# Patient Record
Sex: FEMALE | Race: OTHER | HISPANIC OR LATINO | URBAN - METROPOLITAN AREA
[De-identification: names, ages, dates, MRNs, and addresses within clinical notes are randomized per-mention and may not be internally consistent; named-entity substitution may affect disease eponyms.]

---
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
## 2024-02-03 ENCOUNTER — INPATIENT (INPATIENT)
Facility: HOSPITAL | Age: 19
LOS: 3 days | Discharge: HOME | DRG: 832 | End: 2024-02-07
Attending: OBSTETRICS & GYNECOLOGY | Admitting: OBSTETRICS & GYNECOLOGY
Payer: MEDICAID

## 2024-02-03 VITALS
RESPIRATION RATE: 16 BRPM | TEMPERATURE: 98 F | OXYGEN SATURATION: 97 % | SYSTOLIC BLOOD PRESSURE: 96 MMHG | DIASTOLIC BLOOD PRESSURE: 67 MMHG | HEART RATE: 129 BPM

## 2024-02-03 LAB
APTT BLD: 28.3 SEC — SIGNIFICANT CHANGE UP (ref 24.5–35.6)
BASOPHILS # BLD AUTO: 0.09 K/UL — SIGNIFICANT CHANGE UP (ref 0–0.2)
BASOPHILS NFR BLD AUTO: 0.7 % — SIGNIFICANT CHANGE UP (ref 0–2)
EOSINOPHIL # BLD AUTO: 0.72 K/UL — HIGH (ref 0–0.5)
EOSINOPHIL NFR BLD AUTO: 5.7 % — SIGNIFICANT CHANGE UP (ref 0–6)
FIBRINOGEN PPP-MCNC: 451 MG/DL — HIGH (ref 200–450)
HCT VFR BLD CALC: 36.5 % — SIGNIFICANT CHANGE UP (ref 34.5–45)
HGB BLD-MCNC: 12.3 G/DL — SIGNIFICANT CHANGE UP (ref 11.5–15.5)
IMM GRANULOCYTES NFR BLD AUTO: 1.6 % — HIGH (ref 0–0.9)
INR BLD: 0.94 RATIO — SIGNIFICANT CHANGE UP (ref 0.85–1.18)
LYMPHOCYTES # BLD AUTO: 1.81 K/UL — SIGNIFICANT CHANGE UP (ref 1–3.3)
LYMPHOCYTES # BLD AUTO: 14.3 % — SIGNIFICANT CHANGE UP (ref 13–44)
MCHC RBC-ENTMCNC: 30.6 PG — SIGNIFICANT CHANGE UP (ref 27–34)
MCHC RBC-ENTMCNC: 33.7 GM/DL — SIGNIFICANT CHANGE UP (ref 32–36)
MCV RBC AUTO: 90.8 FL — SIGNIFICANT CHANGE UP (ref 80–100)
MONOCYTES # BLD AUTO: 1.01 K/UL — HIGH (ref 0–0.9)
MONOCYTES NFR BLD AUTO: 8 % — SIGNIFICANT CHANGE UP (ref 2–14)
NEUTROPHILS # BLD AUTO: 8.84 K/UL — HIGH (ref 1.8–7.4)
NEUTROPHILS NFR BLD AUTO: 69.7 % — SIGNIFICANT CHANGE UP (ref 43–77)
PLATELET # BLD AUTO: 329 K/UL — SIGNIFICANT CHANGE UP (ref 150–400)
PROTHROM AB SERPL-ACNC: 10.5 SEC — SIGNIFICANT CHANGE UP (ref 9.5–13)
RBC # BLD: 4.02 M/UL — SIGNIFICANT CHANGE UP (ref 3.8–5.2)
RBC # FLD: 13.8 % — SIGNIFICANT CHANGE UP (ref 10.3–14.5)
WBC # BLD: 12.67 K/UL — HIGH (ref 3.8–10.5)
WBC # FLD AUTO: 12.67 K/UL — HIGH (ref 3.8–10.5)

## 2024-02-03 PROCEDURE — 99285 EMERGENCY DEPT VISIT HI MDM: CPT

## 2024-02-03 RX ORDER — SODIUM CHLORIDE 9 MG/ML
1000 INJECTION INTRAMUSCULAR; INTRAVENOUS; SUBCUTANEOUS ONCE
Refills: 0 | Status: COMPLETED | OUTPATIENT
Start: 2024-02-03 | End: 2024-02-03

## 2024-02-03 RX ORDER — ACETAMINOPHEN 500 MG
1000 TABLET ORAL ONCE
Refills: 0 | Status: COMPLETED | OUTPATIENT
Start: 2024-02-03 | End: 2024-02-03

## 2024-02-03 NOTE — ED PROVIDER NOTE - CLINICAL SUMMARY MEDICAL DECISION MAKING FREE TEXT BOX
18y F presents with new onset seizure. Pt also reports possible pregnancy with LMP 3 months ago. Pt with palpable fundus above umbilicus, POCUS showing ~23 week live IUP. Mildly hypotensive and tachycardic on arrival but well appearing and in no distress with nonfocal neuro exam. Spoke with GYN resident; advising beginning with medical workup for seizure and they will evaluate pt. 18y F presents with new onset seizure. Pt also reports possible pregnancy with LMP 3 months ago. Pt with palpable fundus above umbilicus, POCUS showing ~23 week live IUP. Mildly hypotensive and tachycardic on arrival but well appearing and in no distress with nonfocal neuro exam. Spoke with GYN resident; advising beginning with medical workup for seizure and they will evaluate pt. Plan for admission.

## 2024-02-03 NOTE — ED PROVIDER NOTE - PROGRESS NOTE DETAILS
[place]: CT head negative, no acute findings on labs. Spoke with GYN team, who will admit to their service given that pt is > 20 weeks gestation.

## 2024-02-03 NOTE — ED PROVIDER NOTE - NS ED ROS FT
Constitutional: no fever  CV: no chest pain  Resp: no cough, no shortness of breath  GI: no abdominal pain, no vomiting, no diarrhea  : no dysuria  MSK: no joint pain  Neuro: +headache, +seizure

## 2024-02-03 NOTE — ED PROVIDER NOTE - PHYSICAL EXAMINATION
Constitutional: Awake, alert, in no acute distress  Eyes: no scleral icterus, PERRL, EOMI  HENT: normocephalic, atraumatic, moist oral mucosa, +superficial tongue abrasions  Neck: supple  CV: RRR, no murmur  Pulm: non-labored respirations, CTAB  Abdomen: soft, non-tender, non-distended, +gravid, fundus palpable ~3 cm above umbilicus  Extremities: no edema, no deformity  Skin: no rash, no jaundice  Neuro: AAOx3, moving all extremities equally, CNs II-XII intact, no facial asymmetry, 5/5 strength and sensation in all extremities, no dysmetria

## 2024-02-03 NOTE — ED ADULT TRIAGE NOTE - CHIEF COMPLAINT QUOTE
Pt. BIBA witnessed seizure at work. Pt. states she remembers falling and "shaking". Pt. has no hx of seizures. +tongue bite, + urination. tachycardiac on arrival.

## 2024-02-03 NOTE — ED PROVIDER NOTE - CARE PLAN
1 Principal Discharge DX:	Seizure   Principal Discharge DX:	Seizure  Secondary Diagnosis:	Single pregnancy, second trimester

## 2024-02-03 NOTE — ED PROVIDER NOTE - OBJECTIVE STATEMENT
18y F w/ no known PMH, presents for seizure. Pt has no prior history of seizures. Says she was eating at work tonight and remembers blacking out and feeling shaky. Episode was witnessed by coworkers. Says she bit her tongue and was incontinent of urine. Pt says she might also be pregnant; last menstrual period 3 months ago. Has no prior history of pregnancy and has not seen OB or done home pregnancy test. Endorses mild headache at this time. No abdominal pain or vaginal bleeding. Denies alcohol or drug use.

## 2024-02-04 DIAGNOSIS — Z00.00 ENCOUNTER FOR GENERAL ADULT MEDICAL EXAMINATION WITHOUT ABNORMAL FINDINGS: ICD-10-CM

## 2024-02-04 DIAGNOSIS — G43.909 MIGRAINE, UNSPECIFIED, NOT INTRACTABLE, WITHOUT STATUS MIGRAINOSUS: ICD-10-CM

## 2024-02-04 DIAGNOSIS — O99.350 DISEASES OF THE NERVOUS SYSTEM COMPLICATING PREGNANCY, UNSPECIFIED TRIMESTER: ICD-10-CM

## 2024-02-04 DIAGNOSIS — Z3A.21 21 WEEKS GESTATION OF PREGNANCY: ICD-10-CM

## 2024-02-04 DIAGNOSIS — R56.9 UNSPECIFIED CONVULSIONS: ICD-10-CM

## 2024-02-04 LAB
ALBUMIN SERPL ELPH-MCNC: 3.8 G/DL — SIGNIFICANT CHANGE UP (ref 3.3–5.2)
ALP SERPL-CCNC: 76 U/L — SIGNIFICANT CHANGE UP (ref 40–120)
ALT FLD-CCNC: 21 U/L — SIGNIFICANT CHANGE UP
AMPHET UR-MCNC: NEGATIVE — SIGNIFICANT CHANGE UP
ANION GAP SERPL CALC-SCNC: 14 MMOL/L — SIGNIFICANT CHANGE UP (ref 5–17)
APPEARANCE UR: ABNORMAL
AST SERPL-CCNC: 28 U/L — SIGNIFICANT CHANGE UP
BACTERIA # UR AUTO: ABNORMAL /HPF
BARBITURATES UR SCN-MCNC: NEGATIVE — SIGNIFICANT CHANGE UP
BENZODIAZ UR-MCNC: NEGATIVE — SIGNIFICANT CHANGE UP
BILIRUB SERPL-MCNC: 0.2 MG/DL — LOW (ref 0.4–2)
BILIRUB UR-MCNC: NEGATIVE — SIGNIFICANT CHANGE UP
BLD GP AB SCN SERPL QL: SIGNIFICANT CHANGE UP
BUN SERPL-MCNC: 5.3 MG/DL — LOW (ref 8–20)
CALCIUM SERPL-MCNC: 9 MG/DL — SIGNIFICANT CHANGE UP (ref 8.4–10.5)
CHLORIDE SERPL-SCNC: 102 MMOL/L — SIGNIFICANT CHANGE UP (ref 96–108)
CK SERPL-CCNC: 62 U/L — SIGNIFICANT CHANGE UP (ref 25–170)
CO2 SERPL-SCNC: 20 MMOL/L — LOW (ref 22–29)
COCAINE METAB.OTHER UR-MCNC: NEGATIVE — SIGNIFICANT CHANGE UP
COLOR SPEC: YELLOW — SIGNIFICANT CHANGE UP
CREAT ?TM UR-MCNC: 99 MG/DL — SIGNIFICANT CHANGE UP
CREAT SERPL-MCNC: 0.35 MG/DL — LOW (ref 0.5–1.3)
DIFF PNL FLD: NEGATIVE — SIGNIFICANT CHANGE UP
EGFR: 150 ML/MIN/1.73M2 — SIGNIFICANT CHANGE UP
ETHANOL SERPL-MCNC: <10 MG/DL — SIGNIFICANT CHANGE UP (ref 0–9)
GLUCOSE SERPL-MCNC: 88 MG/DL — SIGNIFICANT CHANGE UP (ref 70–99)
GLUCOSE UR QL: NEGATIVE MG/DL — SIGNIFICANT CHANGE UP
HCG SERPL-ACNC: HIGH MIU/ML
KETONES UR-MCNC: NEGATIVE MG/DL — SIGNIFICANT CHANGE UP
LDH SERPL L TO P-CCNC: 163 U/L — SIGNIFICANT CHANGE UP (ref 98–192)
LEUKOCYTE ESTERASE UR-ACNC: NEGATIVE — SIGNIFICANT CHANGE UP
MAGNESIUM SERPL-MCNC: 1.7 MG/DL — LOW (ref 1.8–2.6)
METHADONE UR-MCNC: NEGATIVE — SIGNIFICANT CHANGE UP
NITRITE UR-MCNC: POSITIVE
OPIATES UR-MCNC: NEGATIVE — SIGNIFICANT CHANGE UP
PCP SPEC-MCNC: SIGNIFICANT CHANGE UP
PCP UR-MCNC: NEGATIVE — SIGNIFICANT CHANGE UP
PH UR: 6 — SIGNIFICANT CHANGE UP (ref 5–8)
POTASSIUM SERPL-MCNC: 3.7 MMOL/L — SIGNIFICANT CHANGE UP (ref 3.5–5.3)
POTASSIUM SERPL-SCNC: 3.7 MMOL/L — SIGNIFICANT CHANGE UP (ref 3.5–5.3)
PROT ?TM UR-MCNC: 18 MG/DL — HIGH (ref 0–12)
PROT SERPL-MCNC: 7.2 G/DL — SIGNIFICANT CHANGE UP (ref 6.6–8.7)
PROT UR-MCNC: SIGNIFICANT CHANGE UP MG/DL
PROT/CREAT UR-RTO: 0.2 RATIO — SIGNIFICANT CHANGE UP
RBC CASTS # UR COMP ASSIST: 2 /HPF — SIGNIFICANT CHANGE UP (ref 0–4)
SODIUM SERPL-SCNC: 136 MMOL/L — SIGNIFICANT CHANGE UP (ref 135–145)
SP GR SPEC: 1.02 — SIGNIFICANT CHANGE UP (ref 1–1.03)
SQUAMOUS # UR AUTO: 14 /HPF — HIGH (ref 0–5)
THC UR QL: NEGATIVE — SIGNIFICANT CHANGE UP
URATE SERPL-MCNC: 4.4 MG/DL — SIGNIFICANT CHANGE UP (ref 2.4–5.7)
UROBILINOGEN FLD QL: 1 MG/DL — SIGNIFICANT CHANGE UP (ref 0.2–1)
WBC UR QL: 7 /HPF — HIGH (ref 0–5)

## 2024-02-04 PROCEDURE — 70450 CT HEAD/BRAIN W/O DYE: CPT | Mod: 26,ME

## 2024-02-04 PROCEDURE — 93010 ELECTROCARDIOGRAM REPORT: CPT

## 2024-02-04 PROCEDURE — 99223 1ST HOSP IP/OBS HIGH 75: CPT

## 2024-02-04 PROCEDURE — 99254 IP/OBS CNSLTJ NEW/EST MOD 60: CPT | Mod: GC

## 2024-02-04 PROCEDURE — G1004: CPT

## 2024-02-04 PROCEDURE — 95720 EEG PHY/QHP EA INCR W/VEEG: CPT

## 2024-02-04 PROCEDURE — 76805 OB US >/= 14 WKS SNGL FETUS: CPT | Mod: 26

## 2024-02-04 RX ORDER — MAGNESIUM SULFATE 500 MG/ML
2 VIAL (ML) INJECTION ONCE
Refills: 0 | Status: COMPLETED | OUTPATIENT
Start: 2024-02-04 | End: 2024-02-04

## 2024-02-04 RX ADMIN — Medication 25 GRAM(S): at 00:53

## 2024-02-04 RX ADMIN — SODIUM CHLORIDE 1000 MILLILITER(S): 9 INJECTION INTRAMUSCULAR; INTRAVENOUS; SUBCUTANEOUS at 00:15

## 2024-02-04 RX ADMIN — Medication 400 MILLIGRAM(S): at 00:14

## 2024-02-04 NOTE — CONSULT NOTE ADULT - SUBJECTIVE AND OBJECTIVE BOX
[facility]                                     Neurology at [place]                                 Patel Sandy, & Bruno                                  [address]                                        [place], [place], [address]                                             [phone]    CC: new onset seizure   HPI:  The patient is a 18y Female, 22- 3/7 weeks pregnant who presented with possible new onset seizure yesterday.  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  She does not remember LOC, but remembers shaking and had tongue bite with urine incontinence.  It is nt clear if there was any significant post-ictal confusion. She denied LOC to me but in the MFM consult she told them that she had LOC.  She deies previous history of seizure and no family history of seizure.  She is at neurological baseline.  Neurology is asked to consult    PAST MEDICAL & SURGICAL HISTORY:  No pertinent past medical history      No significant past surgical history      MEDICATIONS  (STANDING):    MEDICATIONS  (PRN):      Allergies    No Known Allergies    Intolerances    SOCIAL HISTORY:  no tob,   no alcohol   no drugs    FAMILY HISTORY:  no epilepsy    ROS: 14 point ROS negative other than what is present in HPI or below    Vital Signs Last 24 Hrs  T(C): 36.4 (04 Feb 2024 11:59), Max: 37 (04 Feb 2024 10:53)  T(F): 97.6 (04 Feb 2024 11:59), Max: 98.6 (04 Feb 2024 10:53)  HR: 99 (04 Feb 2024 11:59) (84 - 129)  BP: 96/61 (04 Feb 2024 11:59) (93/58 - 135/61)  BP(mean): 70 (04 Feb 2024 10:53) (70 - 70)  RR: 18 (04 Feb 2024 11:59) (16 - 18)  SpO2: 98% (04 Feb 2024 11:59) (97% - 100%)    Parameters below as of 04 Feb 2024 11:59  Patient On (Oxygen Delivery Method): room air    General: NAD    Detailed Neurologic Exam:    Mental status: The patient is awake and alert and has normal attention span.  The patient is fully oriented in 3 spheres. The patient is able to name objects, follow commands, repeat sentences.    Cranial nerves: Pupils equal and react symmetrically to light. There is no visual field deficit to confrontation. Extraocular motion is full with no nystagmus. There is no ptosis. Facial sensation is intact. Facial musculature is symmetric. Palate elevates symmetrically. Tongue is midline.    Motor: There is normal bulk and tone.  There is no tremor.  Strength is 5/5 in the right arm and leg.   Strength is 5/5 in the left arm and leg.    Sensation: Intact to light touch and pin in 4 extremities    Reflexes: 2+ throughout and plantar responses are flexor.    Cerebellar: There is no dysmetria on finger to nose testing.    Gait : deferred    LABS:                         12.3   12.67 )-----------( 329      ( 03 Feb 2024 23:35 )             36.5       02-03    136  |  102  |  5.3<L>  ----------------------------<  88  3.7   |  20.0<L>  |  0.35<L>    Ca    9.0      03 Feb 2024 23:35  Mg     1.7     02-03    TPro  7.2  /  Alb  3.8  /  TBili  0.2<L>  /  DBili  x   /  AST  28  /  ALT  21  /  AlkPhos  76  02-03      PT/INR - ( 03 Feb 2024 23:35 )   PT: 10.5 sec;   INR: 0.94 ratio         PTT - ( 03 Feb 2024 23:35 )  PTT:28.3 sec    RADIOLOGY & ADDITIONAL STUDIES (independently reviewed unless otherwise noted):    IMPRESSION:  No CT evidence of acute intracranial pathology.

## 2024-02-04 NOTE — CONSULT NOTE ADULT - PROBLEM SELECTOR RECOMMENDATION 9
-By bedside sono, pending official US   -No labor complaints   -FH present  -Recommend prenatal labs including: Rubella, Rubeola, RPR, HIV, Hepatitis B, Urine GCCT   -RH status pending -By bedside sono, pending official US   -No labor complaints   -FH present  -Recommend prenatal labs including: Rubella, Rubeola, RPR, HIV, Hepatitis B, Urine GCCT   -RH status pending  -No indications for  corticosteroids for fetal lung maturity or MgSO4 for fetal neuroprotection at this time -No labor complaints   -FH present  -Recommend prenatal labs including: Rubella, Rubeola, RPR, HIV, Hepatitis B, Urine GCCT   -RH positive  -JIMI  by second trimester ultrasound   -No indications for  corticosteroids for fetal lung maturity or MgSO4 for fetal neuroprotection at this time

## 2024-02-04 NOTE — OB PROVIDER H&P - NSHPPHYSICALEXAM_GEN_ALL_CORE
Vital Signs Last 24 Hrs  T(C): 36.9 (04 Feb 2024 18:43), Max: 37 (04 Feb 2024 10:53)  T(F): 98.5 (04 Feb 2024 18:43), Max: 98.6 (04 Feb 2024 10:53)  HR: 101 (04 Feb 2024 18:43) (84 - 129)  BP: 102/67 (04 Feb 2024 18:43) (93/58 - 135/61)  BP(mean): 70 (04 Feb 2024 10:53) (70 - 70)  RR: 18 (04 Feb 2024 18:43) (16 - 18)  SpO2: 98% (04 Feb 2024 18:43) (97% - 100%)    Parameters below as of 04 Feb 2024 18:43  Patient On (Oxygen Delivery Method): room air    Gen: NAD, well-appearing   Abd: Soft, gravid  Uterus: 21cm   Ext: non-tender, non-edematous  SVE: Deferred   Bedside sono: cephalic, anterior placenta, , good fetal movement seen, MVP 6.41, EFW 454g, JIMI 6/9/24

## 2024-02-04 NOTE — CONSULT NOTE ADULT - NSCONSULTADDITIONALINFOA_GEN_ALL_CORE
I have reviewed the documentation and made relevant edits.     In summary 18y  at 22w3d GA JIMI  by second trimester ultrasound who arrived by EMS s/p suspected seizure at work. Reports feeling of darkness, hand shaking at time of seizure. Notable tongue trauma and reports loss of continence. Low suspicion for eclampsia at this time. Magnesium not indicated for seizure ppx. Continue serial BP monitoring.     Bedside ultrasound performed with appropriate dopplers.     Awaiting neurology evaluation and recommendations at this time. Will give patient names of OBGYNs in the area to call for an appointment.     Zaid LOWE Fellow

## 2024-02-04 NOTE — ED ADULT NURSE REASSESSMENT NOTE - NS ED NURSE REASSESS COMMENT FT1
Pt alert and oriented x 4. Pt denies any complaints at this time. OB team at beside,  translating. Pt denies chest pain, dizziness, shortness of breath, or chest pain. Respirations are equal and unlabored.

## 2024-02-04 NOTE — CONSULT NOTE ADULT - ASSESSMENT
The patient is a 18y Female who is followed by neurology because of possible new onset seizure.  Given her description of everything fading to black, the question of LOC, possible dehydration and 22 3/7 weeks pregnant this may be convulsive syncope vs epileptic seizure.    Syncope vs seizure  Suggest    - continuous video EEG (I have ordered this)   - MRI brain with/without contrast, seizure protocol if she is cleared by OB/MFM for a contrast enhanced MRI.  If she is unable to have contrast then a non contrast MRI with seizure protocol can be done   - given her pregnancy and question that this may be syncope, I would hold on anti-seizure medications at this time.   - benzodiaepine prn seizure- suggest ativan 1 mg IV as needed for seizure if cleared by OB/MFM   - if she has breakthrough seizure I would suggest a 2000 mg IV levetiracetam loading dose followed by 500 mg po bid   - Initiation of seizure medication otherwise will be dependent on results of EEG and MRI (if able to perform)    discussed with Dr Cedeno from OB.     will follow with you    Danyel Sweeney MD PhD   [id number]

## 2024-02-04 NOTE — CHART NOTE - NSCHARTNOTEFT_GEN_A_CORE
EEG preliminary read (not final) on the initial recording hour(s) = x 3    No seizures recorded.    Final report to follow tomorrow morning after completion of study.    [facility] EEG Reading Room Ph#: [phone]  Epilepsy Answering Service after 5PM and before 8:30AM: Ph#: [phone]

## 2024-02-04 NOTE — ED ADULT NURSE NOTE - HOW OFTEN DO YOU HAVE A DRINK CONTAINING ALCOHOL?
Cyclophosphamide Counseling:  I discussed with the patient the risks of cyclophosphamide including but not limited to hair loss, hormonal abnormalities, decreased fertility, abdominal pain, diarrhea, nausea and vomiting, bone marrow suppression and infection. The patient understands that monitoring is required while taking this medication. Never

## 2024-02-04 NOTE — OB PROVIDER H&P - ASSESSMENT
18y  at 22w3d GA JIMI  by second trimester ultrasound who was BIBEMS s/p suspected seizure; MFM consulted as patient is pregnant.

## 2024-02-04 NOTE — CHART NOTE - NSCHARTNOTEFT_GEN_A_CORE
Discussed neurology's recommendation for contrast MRI. Discussed purpose to be used in conjunction with EEG to determine seizure medication for use if needed. Discussed that gadolinium is not well-studied in pregnancy, but small studies have shown association with increased risk of fetal haematologic and inflammatory and infiltrative skin conditions +stillbirth and  demise. Discussed alternative of using non-contrast MRI which has no fetal risks. Patient desires more time to consider both options. Patient to receive MRI s/p EEG for 24-48h.    Per PP charge RN, patient unable to be accepted to [facility] because requires EEG monitoring. Logistics states currently no bed available - to remain in CDU for time being.

## 2024-02-04 NOTE — CONSULT NOTE ADULT - SUBJECTIVE AND OBJECTIVE BOX
18y  at 21w6d GA by bedside sonogram today JIMI 24  who was BIBEMS s/p suspected seizure. Patient was at work, she had a migraine, felt lightheaded, started to feel left hand numbness and shaking and lost consciousness. She was at work and the episode was witnessed. She is unable to say if people saw convulsions. She is unable to say how she fell, but she believes that someone caught her as she was falling. She endorses soreness in her cerebellar area. Denies headaches, visual disturbances, RUQ pain, epigastric pain and new-onset edema. Patient denies vaginal bleeding, contractions and leakage of fluid. She endorses good fetal movement. Denies fevers, chills, nausea and vomiting. No other complaints at this time.   JIMI: 24, to be confirmed by official US   LMP: Early October   Prenatal course is significant for:  1. No prenatal care   2. History of migraines     POB: Denies   PGYN: -fibroids, -ovarian cysts, denies STD hx  PMH: Migraines   PSH: Denies  SH: Denies EtOH, tobacco and illicit drug use during this pregnancy; feels safe at home   Meds: Denies   Allergies: NKDA      T(C): 36.7 (24 @ 22:45), Max: 36.7 (24 @ 22:45)  HR: 129 (24 @ 22:45) (129 - 129)  BP: 96/67 (24 @ 22:45) (96/67 - 96/67)  RR: 16 (24 @ 22:45) (16 - 16)  SpO2: 97% (24 @ 22:45) (97% - 97%)    Gen: NAD, well-appearing   Abd: Soft, gravid  Uterus: 21cm   Ext: non-tender, non-edematous  SVE: Deferred   Bedside sono: cephalic, anterior placenta, , good fetal movement seen, MVP 6.41, EFW 454g, JIMI 24    LABS:                          12.3   12.67 )-----------( 329      ( 2024 23:35 )             36.5        18y  at 21w6d GA by bedside sonogram today JIMI 24  who was BIBEMS s/p suspected seizure. Patient was at work, she had a migraine, felt lightheaded, started to feel left hand numbness and shaking and lost consciousness. She was at work and the episode was witnessed. She is unable to say if people saw convulsions. She is unable to say how she fell, but she believes that someone caught her as she was falling. She endorses soreness in her cerebellar area. Denies headaches, visual disturbances, RUQ pain, epigastric pain and new-onset edema. Patient denies vaginal bleeding, contractions and leakage of fluid. She endorses good fetal movement. Denies fevers, chills, nausea and vomiting. No other complaints at this time.   JIMI: 24, to be confirmed by official US   LMP: Early October   Prenatal course is significant for:  1. No prenatal care   2. History of migraines     POB: Denies   PGYN: -fibroids, -ovarian cysts, denies STD hx  PMH: Migraines   PSH: Denies  SH: Denies EtOH, tobacco and illicit drug use during this pregnancy; feels safe at home   Meds: Denies   Allergies: NKDA      T(C): 36.7 (24 @ 22:45), Max: 36.7 (24 @ 22:45)  HR: 129 (24 @ 22:45) (129 - 129)  BP: 96/67 (24 @ 22:45) (96/67 - 96/67)  RR: 16 (24 @ 22:45) (16 - 16)  SpO2: 97% (24 @ 22:45) (97% - 97%)    Gen: NAD, well-appearing   Abd: Soft, gravid  Uterus: 21cm   Ext: non-tender, non-edematous  SVE: Deferred   Bedside sono: cephalic, anterior placenta, , good fetal movement seen, MVP 6.41, EFW 454g, JIMI 24    LABS:                          12.3   12.67 )-----------( 329      ( 2024 23:35 )             36.5         136  |  102  |  5.3<L>  ----------------------------<  88  3.7   |  20.0<L>  |  0.35<L>    Ca    9.0      2024 23:35  Mg     1.7         TPro  7.2  /  Alb  3.8  /  TBili  0.2<L>  /  DBili  x   /  AST  28  /  ALT  21  /  AlkPhos  76       18y  at 22w3d GA JIMI  by second trimester ultrasound who was BIBEMS s/p suspected seizure. Patient was at work, she had a migraine, felt lightheaded, started to feel left hand numbness and shaking and lost consciousness. She was at work and the episode was witnessed. She is unable to say if people saw convulsions. She is unable to say how she fell, but she believes that someone caught her as she was falling. She endorses soreness in her cerebellar area. Denies headaches, visual disturbances, RUQ pain, epigastric pain and new-onset edema. Patient denies vaginal bleeding, contractions and leakage of fluid. She endorses good fetal movement. Denies fevers, chills, nausea and vomiting. No other complaints at this time.   JIMI: 24, to be confirmed by official US   LMP: Early October   Prenatal course is significant for:  1. No prenatal care   2. History of migraines     POB: Denies   PGYN: -fibroids, -ovarian cysts, denies STD hx  PMH: Migraines   PSH: Denies  SH: Denies EtOH, tobacco and illicit drug use during this pregnancy; feels safe at home   Meds: Denies   Allergies: NKDA      T(C): 36.7 (24 @ 22:45), Max: 36.7 (24 @ 22:45)  HR: 129 (24 @ 22:45) (129 - 129)  BP: 96/67 (24 @ 22:45) (96/67 - 96/67)  RR: 16 (24 @ 22:45) (16 - 16)  SpO2: 97% (24 @ 22:45) (97% - 97%)    Gen: NAD, well-appearing   Abd: Soft, gravid  Uterus: 21cm   Ext: non-tender, non-edematous  SVE: Deferred   Bedside sono: cephalic, anterior placenta, , good fetal movement seen, MVP 6.41, EFW 454g, JIMI 24    LABS:                          12.3   12.67 )-----------( 329      ( 2024 23:35 )             36.5         136  |  102  |  5.3<L>  ----------------------------<  88  3.7   |  20.0<L>  |  0.35<L>    Ca    9.0      2024 23:35  Mg     1.7         TPro  7.2  /  Alb  3.8  /  TBili  0.2<L>  /  DBili  x   /  AST  28  /  ALT  21  /  AlkPhos  76

## 2024-02-04 NOTE — OB PROVIDER H&P - NSHPLABSRESULTS_GEN_ALL_CORE
12.3   12.67 )-----------( 329      ( 03 Feb 2024 23:35 )             36.5     02-03    136  |  102  |  5.3<L>  ----------------------------<  88  3.7   |  20.0<L>  |  0.35<L>    Ca    9.0      03 Feb 2024 23:35  Mg     1.7     02-03    TPro  7.2  /  Alb  3.8  /  TBili  0.2<L>  /  DBili  x   /  AST  28  /  ALT  21  /  AlkPhos  76  02-03

## 2024-02-04 NOTE — CONSULT NOTE ADULT - ASSESSMENT
A/P: 18y  at 21w6d GA by bedside sonogram today JIMI 24  who was BIBEMS s/p suspected seizure; MFM consulted as patient is pregnant.     Plan to follow pending labs     Plan to be discussed with Dr. Blancas and Dr. Zambrano.  A/P: 18y  at 21w6d GA by bedside sonogram today JIMI 24  who was BIBEMS s/p suspected seizure; MFM consulted as patient is pregnant.     Plan to be discussed with Dr. Blancas and Dr. Zambrano.  A/P: 18y  at 22w3d GA JIMI  by second trimester ultrasound who was BIBEMS s/p suspected seizure; MFM consulted as patient is pregnant.     Plan discussed with Dr. Blancas and Dr. Zambrano.

## 2024-02-04 NOTE — OB PROVIDER H&P - HISTORY OF PRESENT ILLNESS
18y  at 22w3d GA JIMI  by second trimester ultrasound who was BIBEMS s/p suspected seizure. Patient was at work, she had a migraine, felt lightheaded, started to feel left hand numbness and shaking and lost consciousness. She was at work and the episode was witnessed. She is unable to say if people saw convulsions. She is unable to say how she fell, but she believes that someone caught her as she was falling. She endorses soreness in her cerebellar area. Denies headaches, visual disturbances, RUQ pain, epigastric pain and new-onset edema. Patient denies vaginal bleeding, contractions and leakage of fluid. She endorses good fetal movement. Denies fevers, chills, nausea and vomiting. No other complaints at this time.     JIMI: 24, to be confirmed by official US   LMP: Early October     Prenatal course is significant for:  1. No prenatal care   2. History of migraines     POB: Denies   PGYN: -fibroids, -ovarian cysts, denies STD hx  PMH: Migraines   PSH: Denies  SH: Denies EtOH, tobacco and illicit drug use during this pregnancy; feels safe at home   Meds: Denies   Allergies: NKDA

## 2024-02-04 NOTE — OB PROVIDER H&P - PROBLEM SELECTOR PLAN 3
-Suspected seizure witnessed at work   -BPs WNL (96/67) recommend serial blood pressures while admitted.   -HELLP labs WNL. No further HELLP labs indicated  -Seizure precautions   -Low suspicion for preeclampsia/eclampsia at this time.    -Neurology recommending 24-48h EEG, MRI with contrast - will start seizure ppx pending these results. Discussion of contrast with patient detailed in later note. Ativan 1 prn ordered for seizure like activity

## 2024-02-04 NOTE — CONSULT NOTE ADULT - ATTENDING COMMENTS
19 year old  at 22 weeks 3 days gestation presenting with witnessed seizure. No prenatal care to date. Patient described incident as written above. Normal vital signs, benign physical exam. Bedside ultrasound performed with normal anatomic evaluation but limited. Gross movement appreciated, normal FH. Appreciate management with neurology team. Low suspicion for eclamptic seizure as etiology given normal blood pressures, no lab abnormalities, no fetal distress or signs of placental resistance. Plan to assist in coordination of care for the patient. SW consultation appreciated. Epileptic workup per neurology.

## 2024-02-04 NOTE — OB PROVIDER H&P - PROBLEM SELECTOR PLAN 1
-No labor complaints   -FH present  -Recommend prenatal labs including: Rubella, Rubeola, RPR, HIV, Hepatitis B, Urine GCCT   -RH positive  -JIMI  by second trimester ultrasound   -No indications for  corticosteroids for fetal lung maturity or MgSO4 for fetal neuroprotection at this time.

## 2024-02-04 NOTE — CONSULT NOTE ADULT - PROBLEM SELECTOR RECOMMENDATION 3
-Suspected seizure witnessed at work   -Seizure precautions   -Agree with neurology consult   -BPs WNL (96/67 - 96/67)  -HELLP labs pending   -Low suspicion for preeclampsia at this time, but cannot be 100% ruled out -Suspected seizure witnessed at work   -BPs WNL (96/67 - 96/67)  -HELLP labs WNL, recommend repeating within 12 hours   -Seizure precautions   -Agree with neurology consult   -Recommend observation for 24 hours   -Low suspicion for preeclampsia/eclampsia at this time -Suspected seizure witnessed at work   -BPs WNL (96/67) recommend serial blood pressures while admitted.   -HELLP labs WNL. No further HELLP labs indicated  -Seizure precautions   -Awaiting neurology recs  -Low suspicion for preeclampsia/eclampsia at this time

## 2024-02-04 NOTE — ED ADULT NURSE NOTE - OBJECTIVE STATEMENT
18y F w/ no known PMH, presents for seizure. Pt has no prior history of seizures. Says she was eating at work tonight and remembers blacking out and feeling shaky. Episode was witnessed by coworkers. Says she bit her tongue and was incontinent of urine. Pt says she might also be pregnant; last menstrual period 3 months ago.  Seen and evaluated by provider, orders obtained and noted.  IV placed, blood specimens obtained and sent to lab.  IVF bolus infusing as ordered.  Medicated as ordered.  Resting comfortably on stretcher in no acute distress.  Boyfriend at bedside.  Awaiting ultrasound.  Offers no further complaints at this time.

## 2024-02-04 NOTE — ED ADULT NURSE REASSESSMENT NOTE - NS ED NURSE REASSESS COMMENT FT1
Pt alert and oriented x 4. Pt denies having any pain or discomfort at this time. Negative chest pain, shortness of breath, abdominal pain, dizziness, or headache. Pt connected to countinous pulse ox. Respirations are equal and unlabored on room air, pt speaking full complete coherent sentences. Pt left in position of comfort, bed of wheels locked and in the lowest position.

## 2024-02-05 LAB
HBV SURFACE AG SERPL QL IA: SIGNIFICANT CHANGE UP
HIV 1 & 2 AB SERPL IA.RAPID: SIGNIFICANT CHANGE UP
MEV IGG SER-ACNC: >300 AU/ML — SIGNIFICANT CHANGE UP
MEV IGG+IGM SER-IMP: POSITIVE — SIGNIFICANT CHANGE UP
RUBV IGG SER-ACNC: 3.9 INDEX — SIGNIFICANT CHANGE UP
RUBV IGG SER-IMP: POSITIVE — SIGNIFICANT CHANGE UP
T PALLIDUM AB TITR SER: NEGATIVE — SIGNIFICANT CHANGE UP

## 2024-02-05 PROCEDURE — 99232 SBSQ HOSP IP/OBS MODERATE 35: CPT | Mod: GC

## 2024-02-05 PROCEDURE — 95720 EEG PHY/QHP EA INCR W/VEEG: CPT

## 2024-02-05 PROCEDURE — 99232 SBSQ HOSP IP/OBS MODERATE 35: CPT

## 2024-02-05 RX ADMIN — Medication 1 TABLET(S): at 11:12

## 2024-02-05 NOTE — PROGRESS NOTE ADULT - ASSESSMENT
A/P: 18y  at 22w3d GA JIMI  by second trimester ultrasound who was BIBEMS s/p suspected seizure; MFM consulted as patient is pregnant.     Plan discussed with Dr. Blancas and Dr. Zambrano.  A/P: 18y  at 22w4d GA JIMI  by second trimester ultrasound who was BIBEMS s/p suspected seizure, admitted to OB MFM with neurology consulted

## 2024-02-05 NOTE — EEG REPORT - NS EEG TEXT BOX
EDMAR HOLGUIN [id number]     Study Date: 02-04-24 1508 02-05-24 0800  Duration x Hours: 16h   --------------------------------------------------------------------------------------------------  History:  CC/ HPI Patient is a 18y old  Female who presents with a chief complaint of LOC.   MEDICATIONS  (STANDING):  prenatal multivitamin 1 Tablet(s) Oral daily    --------------------------------------------------------------------------------------------------  Study Interpretation:    [[[Abbreviation Key:  PDR=alpha rhythm/posterior dominant rhythm. A-P=anterior posterior.  Amplitude: ‘very low’:<20; ‘low’:20-49; ‘medium’:; ‘high’:>150uV.  Persistence for periodic/rhythmic patterns (% of epoch) ‘rare’:<1%; ‘occasional’:1-10%; ‘frequent’:10-50%; ‘abundant’:50-90%; ‘continuous’:>90%.  Persistence for sporadic discharges: ‘rare’:<1/hr; ‘occasional’:1/min-1/hr; ‘frequent’:>1/min; ‘abundant’:>1/10 sec.  RPP=rhythmic and periodic patterns; GRDA=generalized rhythmic delta activity; FIRDA=frontal intermittent GRDA; LRDA=lateralized rhythmic delta activity; TIRDA=temporal intermittent rhythmic delta activity;  LPD=PLED=lateralized periodic discharges; GPD=generalized periodic discharges; BIPDs =bilateral independent periodic discharges; Mf=multifocal; SIRPDs=stimulus induced rhythmic, periodic, or ictal appearing discharges; BIRDs=brief potentially ictal rhythmic discharges >4 Hz, lasting .5-10s; PFA (paroxysmal bursts >13 Hz or =8 Hz <10s).  Modifiers: +F=with fast component; +S=with spike component; +R=with rhythmic component.  S-B=burst suppression pattern.  Max=maximal. N1-drowsy; N2-stage II sleep; N3-slow wave sleep. SSS/BETS=small sharp spikes/benign epileptiform transients of sleep. HV=hyperventilation; PS=photic stimulation]]]    Daily EEG Visual Analysis    FINDINGS:      Background:  Continuity: continuous  Symmetry: symmetric  PDR: 11 Hz activity, with amplitude to 40 uV, that attenuated to eye opening.  Low amplitude frontal beta noted in wakefulness.  Reactivity: present  Voltage: normal, mostly 20-150uV  Anterior Posterior Gradient: present  Other background findings: none  Breach: absent    Background Slowing:  Generalized slowing: none was present.  Focal slowing: none was present.    State Changes:   -Drowsiness noted with increased slowing, attenuation of fast activity, vertex transients.  -Present with N2 sleep transients with symmetric spindles and K-complexes.    Sporadic Epileptiform Discharges:    None    Rhythmic and Periodic Patterns (RPPs):  None     Electrographic and Electroclinical seizures:  None    Other Clinical Events:  None    Activation Procedures:   -Hyperventilation was not performed.    -Photic stimulation was not performed.    Artifacts:  Intermittent myogenic and movement artifacts were noted.    ECG:  The heart rate on single channel ECG was predominantly between 70 to 90 BPM.    EEG Classification / Summary:  Normal  EEG in the awake / drowsy / asleep state(s).    Clinical Impression:  There were no epileptiform abnormalities recorded.      This is fellow preliminary read, pending attending review.  -------------------------------------------------------------------------------------------------------  [facility] EEG Reading Room Ph#: [phone]  Epilepsy Answering Service after 5PM and before 8:30AM: Ph#: [phone]    EVANS Canales  Epilepsy Fellow   EDMAR HOLGUIN [id number]     Study Date: 02-04-24 1508 02-05-24 0800  Duration x Hours: 16h   --------------------------------------------------------------------------------------------------  History:  CC/ HPI Patient is a 18y old  Female who presents with a chief complaint of LOC.   MEDICATIONS  (STANDING):  prenatal multivitamin 1 Tablet(s) Oral daily    --------------------------------------------------------------------------------------------------  Study Interpretation:    [[[Abbreviation Key:  PDR=alpha rhythm/posterior dominant rhythm. A-P=anterior posterior.  Amplitude: ‘very low’:<20; ‘low’:20-49; ‘medium’:; ‘high’:>150uV.  Persistence for periodic/rhythmic patterns (% of epoch) ‘rare’:<1%; ‘occasional’:1-10%; ‘frequent’:10-50%; ‘abundant’:50-90%; ‘continuous’:>90%.  Persistence for sporadic discharges: ‘rare’:<1/hr; ‘occasional’:1/min-1/hr; ‘frequent’:>1/min; ‘abundant’:>1/10 sec.  RPP=rhythmic and periodic patterns; GRDA=generalized rhythmic delta activity; FIRDA=frontal intermittent GRDA; LRDA=lateralized rhythmic delta activity; TIRDA=temporal intermittent rhythmic delta activity;  LPD=PLED=lateralized periodic discharges; GPD=generalized periodic discharges; BIPDs =bilateral independent periodic discharges; Mf=multifocal; SIRPDs=stimulus induced rhythmic, periodic, or ictal appearing discharges; BIRDs=brief potentially ictal rhythmic discharges >4 Hz, lasting .5-10s; PFA (paroxysmal bursts >13 Hz or =8 Hz <10s).  Modifiers: +F=with fast component; +S=with spike component; +R=with rhythmic component.  S-B=burst suppression pattern.  Max=maximal. N1-drowsy; N2-stage II sleep; N3-slow wave sleep. SSS/BETS=small sharp spikes/benign epileptiform transients of sleep. HV=hyperventilation; PS=photic stimulation]]]    Daily EEG Visual Analysis    FINDINGS:      Background:  Continuity: continuous  Symmetry: symmetric  PDR: 11 Hz activity, with amplitude to 40 uV, that attenuated to eye opening.  Low amplitude frontal beta noted in wakefulness.  Reactivity: present  Voltage: normal, mostly 20-150uV  Anterior Posterior Gradient: present  Other background findings: none  Breach: absent    Background Slowing:  Generalized slowing: none was present.  Focal slowing: none was present.    State Changes:   -Drowsiness noted with increased slowing, attenuation of fast activity, vertex transients.  -Present with N2 sleep transients with symmetric spindles and K-complexes.    Sporadic Epileptiform Discharges:    None    Rhythmic and Periodic Patterns (RPPs):  None     Electrographic and Electroclinical seizures:  None    Other Clinical Events:  None    Activation Procedures:   -Hyperventilation was not performed.    -Photic stimulation was not performed.    Artifacts:  Intermittent myogenic and movement artifacts were noted.    ECG:  The heart rate on single channel ECG was predominantly between 70 to 90 BPM.    EEG Classification / Summary:  Normal  EEG in the awake / drowsy / asleep state(s).    Clinical Impression:  There were no epileptiform abnormalities recorded.        -------------------------------------------------------------------------------------------------------  [facility] EEG Reading Room Ph#: [phone]  Epilepsy Answering Service after 5PM and before 8:30AM: Ph#: [phone]    EVANS Canales  Epilepsy Fellow

## 2024-02-05 NOTE — PROGRESS NOTE ADULT - SUBJECTIVE AND OBJECTIVE BOX
18y  at 22w3d GA JIMI  by second trimester ultrasound who was BIBEMS s/p suspected seizure. Patient was at work, she had a migraine, felt lightheaded, started to feel left hand numbness and shaking and lost consciousness. She was at work and the episode was witnessed. She is unable to say if people saw convulsions. She is unable to say how she fell, but she believes that someone caught her as she was falling. She endorses soreness in her cerebellar area. Denies headaches, visual disturbances, RUQ pain, epigastric pain and new-onset edema. Patient denies vaginal bleeding, contractions and leakage of fluid. She endorses good fetal movement. Denies fevers, chills, nausea and vomiting. No other complaints at this time.   JIMI: 24, to be confirmed by official US   LMP: Early October   Prenatal course is significant for:  1. No prenatal care   2. History of migraines     POB: Denies   PGYN: -fibroids, -ovarian cysts, denies STD hx  PMH: Migraines   PSH: Denies  SH: Denies EtOH, tobacco and illicit drug use during this pregnancy; feels safe at home   Meds: Denies   Allergies: NKDA      T(C): 36.7 (24 @ 22:45), Max: 36.7 (24 @ 22:45)  HR: 129 (24 @ 22:45) (129 - 129)  BP: 96/67 (24 @ 22:45) (96/67 - 96/67)  RR: 16 (24 @ 22:45) (16 - 16)  SpO2: 97% (24 @ 22:45) (97% - 97%)    Gen: NAD, well-appearing   Abd: Soft, gravid  Uterus: 21cm   Ext: non-tender, non-edematous  SVE: Deferred   Bedside sono: cephalic, anterior placenta, , good fetal movement seen, MVP 6.41, EFW 454g, IJMI 24    LABS:                          12.3   12.67 )-----------( 329      ( 2024 23:35 )             36.5         136  |  102  |  5.3<L>  ----------------------------<  88  3.7   |  20.0<L>  |  0.35<L>    Ca    9.0      2024 23:35  Mg     1.7         TPro  7.2  /  Alb  3.8  /  TBili  0.2<L>  /  DBili  x   /  AST  28  /  ALT  21  /  AlkPhos  76       [facility] PROGRESS NOTE    Patient seen and examined at bedside.    18y  at 22w4d GA JIMI  by second trimester ultrasound who was BIBEMS s/p suspected seizure.     Subjective: She reports no seizure like activity overnight. Feeling good fetal movement, denies bleeding, denies leakage of fluid.    Vital Signs Last 24 Hrs  T(C): 36.4 (2024 04:11), Max: 37 (2024 10:53)  T(F): 97.5 (2024 04:11), Max: 98.6 (2024 10:53)  HR: 99 (2024 04:11) (84 - 104)  BP: 106/69 (2024 04:11) (93/59 - 135/61)  BP(mean): 70 (2024 10:53) (70 - 70)  RR: 18 (2024 04:11) (16 - 18)  SpO2: 97% (2024 04:11) (97% - 99%)    Parameters below as of 2024 04:11  Patient On (Oxygen Delivery Method): room air        Gen: NAD, well-appearing   Abd: Soft, gravid  Uterus: 21cm   Ext: non-tender, non-edematous      LABS:                          12.3   12.67 )-----------( 329      ( 2024 23:35 )             36.5     02-03    136  |  102  |  5.3<L>  ----------------------------<  88  3.7   |  20.0<L>  |  0.35<L>    Ca    9.0      2024 23:35  Mg     1.7     02-03    TPro  7.2  /  Alb  3.8  /  TBili  0.2<L>  /  DBili  x   /  AST  28  /  ALT  21  /  AlkPhos  76  02-03     [facility] PROGRESS NOTE    Patient seen and examined at bedside.  ID #[id number]    18y  at 22w4d GA JIMI  by second trimester ultrasound who was BIBEMS s/p suspected seizure.     Subjective: She reports no seizure like activity overnight. Feeling good fetal movement, denies bleeding, denies leakage of fluid.    Vital Signs Last 24 Hrs  T(C): 36.4 (2024 04:11), Max: 37 (2024 10:53)  T(F): 97.5 (2024 04:11), Max: 98.6 (2024 10:53)  HR: 99 (2024 04:11) (84 - 104)  BP: 106/69 (2024 04:11) (93/59 - 135/61)  BP(mean): 70 (2024 10:53) (70 - 70)  RR: 18 (2024 04:11) (16 - 18)  SpO2: 97% (2024 04:11) (97% - 99%)    Parameters below as of 2024 04:11  Patient On (Oxygen Delivery Method): room air        Gen: NAD, well-appearing   Abd: Soft, gravid  Uterus: 21cm   Ext: non-tender, non-edematous      LABS:                          12.3   12.67 )-----------( 329      ( 2024 23:35 )             36.5     02-03    136  |  102  |  5.3<L>  ----------------------------<  88  3.7   |  20.0<L>  |  0.35<L>    Ca    9.0      2024 23:35  Mg     1.7     02-03    TPro  7.2  /  Alb  3.8  /  TBili  0.2<L>  /  DBili  x   /  AST  28  /  ALT  21  /  AlkPhos  76  02-03

## 2024-02-05 NOTE — PROGRESS NOTE ADULT - PROBLEM SELECTOR PLAN 3
-BPs WNL (96/67) recommend serial blood pressures while admitted.   -HELLP labs WNL. No further HELLP labs indicated  -Seizure precautions   -Low suspicion for preeclampsia/eclampsia at this time.    -Neurology recommending 24-48h EEG, MRI with contrast - will start seizure ppx pending these results. Discussion of contrast with patient detailed in later note. Ativan 1 prn ordered for seizure like activity.

## 2024-02-05 NOTE — PROGRESS NOTE ADULT - SUBJECTIVE AND OBJECTIVE BOX
[facility]                                        Neurology at [place]                                 Patel Sandy, & Bruno                                  [address]                                        [place], [place], [address]                                             [phone]        CC: Syncope vs seizure.     HPI:   The patient is a 18y Female, 22- 3/7 weeks pregnant who presented with possible new onset seizure yesterday.  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  She does not remember LOC, but remembers shaking and had tongue bite with urine incontinence.  It is nt clear if there was any significant post-ictal confusion. She denied LOC to me but in the M consult she told them that she had LOC.  She deies previous history of seizure and no family history of seizure.  She is at neurological baseline.  Neurology is asked to consult (AR).    Interim history:  On 2 Gulden.   No further episodes.    ROS:   Denies headache or dizziness.  Denies chest pain.  Denies shortness of breath.    MEDICATIONS  (STANDING):  prenatal multivitamin 1 Tablet(s) Oral daily    Vital Signs Last 24 Hrs  T(C): 36.6 (05 Feb 2024 08:19), Max: 37 (04 Feb 2024 10:53)  T(F): 97.8 (05 Feb 2024 08:19), Max: 98.6 (04 Feb 2024 10:53)  HR: 88 (05 Feb 2024 08:19) (88 - 104)  BP: 96/60 (05 Feb 2024 08:19) (93/59 - 135/61)  BP(mean): 70 (04 Feb 2024 10:53) (70 - 70)  RR: 17 (05 Feb 2024 08:19) (17 - 18)  SpO2: 97% (05 Feb 2024 08:19) (97% - 99%)    Parameters below as of 05 Feb 2024 08:19  Patient On (Oxygen Delivery Method): room air    Detailed Neurologic Exam:    Mental status: The patient is awake and alert. There is no aphasia. There is no dysarthria.     Cranial nerves: Pupils equal and react symmetrically to light. There is no visual field deficit to threat. Extraocular motion is full with no nystagmus. Facial sensation is intact. Facial musculature is symmetric. Palate elevates symmetrically. Tongue is midline.    Motor: There is normal bulk and tone.  There is no tremor.  Strength grossly 5/5 bilaterally.    Sensation: Grossly intact to light touch and pin.    Reflexes: 2+ throughout and plantar responses are flexor.    Cerebellar: No dysmetria on finger nose testing.    Labs:     02-03    136  |  102  |  5.3<L>  ----------------------------<  88  3.7   |  20.0<L>  |  0.35<L>    Ca    9.0      03 Feb 2024 23:35  Mg     1.7     02-03    TPro  7.2  /  Alb  3.8  /  TBili  0.2<L>  /  DBili  x   /  AST  28  /  ALT  21  /  AlkPhos  76  02-03                            12.3   12.67 )-----------( 329      ( 03 Feb 2024 23:35 )             36.5

## 2024-02-05 NOTE — PROGRESS NOTE ADULT - ASSESSMENT
18 year old woman who is 22 + weeks pregnant. Now status post episode of syncope vs seizure.    Syncope vs seizure  EEG in progress.   Will need brain MRI once EEG complete.  If any significant abnormality on above, will need antiseizure drug (Keppra 500 mg BID to start).   If she has further seizure I would suggest a 2000 mg IV levetiracetam loading dose followed by 500 mg po bid.

## 2024-02-05 NOTE — PROGRESS NOTE ADULT - PROBLEM SELECTOR PLAN 1
-No labor complaints   -FH present  -Recommend prenatal labs including pending this AM: Rubella, Rubeola, RPR, HIV, Hepatitis B, Urine GCCT   -Rh positive  -JIMI  by second trimester ultrasound   -No indications for  corticosteroids for fetal lung maturity or MgSO4 for fetal neuroprotection at this time.

## 2024-02-06 ENCOUNTER — TRANSCRIPTION ENCOUNTER (OUTPATIENT)
Age: 19
End: 2024-02-06

## 2024-02-06 DIAGNOSIS — O09.33 SUPERVISION OF PREGNANCY WITH INSUFFICIENT ANTENATAL CARE, THIRD TRIMESTER: ICD-10-CM

## 2024-02-06 DIAGNOSIS — R55 SYNCOPE AND COLLAPSE: ICD-10-CM

## 2024-02-06 DIAGNOSIS — O23.40 UNSPECIFIED INFECTION OF URINARY TRACT IN PREGNANCY, UNSPECIFIED TRIMESTER: ICD-10-CM

## 2024-02-06 PROCEDURE — 76377 3D RENDER W/INTRP POSTPROCES: CPT | Mod: 26

## 2024-02-06 PROCEDURE — 99232 SBSQ HOSP IP/OBS MODERATE 35: CPT

## 2024-02-06 PROCEDURE — 95813 EEG EXTND MNTR 61-119 MIN: CPT | Mod: 26

## 2024-02-06 PROCEDURE — 70551 MRI BRAIN STEM W/O DYE: CPT | Mod: 26

## 2024-02-06 PROCEDURE — 99233 SBSQ HOSP IP/OBS HIGH 50: CPT | Mod: GC

## 2024-02-06 RX ORDER — CEPHALEXIN 500 MG
500 CAPSULE ORAL
Refills: 0 | Status: DISCONTINUED | OUTPATIENT
Start: 2024-02-06 | End: 2024-02-07

## 2024-02-06 RX ADMIN — Medication 500 MILLIGRAM(S): at 11:38

## 2024-02-06 RX ADMIN — Medication 500 MILLIGRAM(S): at 19:37

## 2024-02-06 RX ADMIN — Medication 500 MILLIGRAM(S): at 23:45

## 2024-02-06 RX ADMIN — Medication 1 TABLET(S): at 11:38

## 2024-02-06 NOTE — PROGRESS NOTE ADULT - ASSESSMENT
A/P: 18y  at 22w5d GA JIMI  by second trimester ultrasound who was BIBEMS s/p suspected seizure, admitted to OB MFM with neurology consulted A/P: 18y  at 22w5d GA JIMI 24 by second trimester ultrasound. Hospitalized for suspected syncopal convulsion versus seizure disorder. Neurology following patient. A/P: 18y  at 22w5d GA JIMI 24 by second trimester ultrasound. Hospitalized for suspected syncopal seizure versus seizure disorder. Neurology following patient.

## 2024-02-06 NOTE — DISCHARGE NOTE ANTEPARTUM - HOSPITAL COURSE
17 yo  admitted for seizure like activity, had not initiated prenatal care approximately 22-23w by ED US dating. She received 48h of EEG monitoring with no seizure activity seen, MRI noncontrast with no findings. 17 yo  admitted for seizure like activity, had not initiated prenatal care approximately 22-23w by ED US dating. She received 48h of EEG monitoring with no seizure activity seen, MRI noncontrast with no findings. No intervention recommended by Neurology.

## 2024-02-06 NOTE — PROGRESS NOTE ADULT - PROBLEM SELECTOR PLAN 3
-BPs WNL (96/67) recommend serial blood pressures while admitted.   -HELLP labs WNL. No further HELLP labs indicated  -Seizure precautions   -Low suspicion for preeclampsia/eclampsia at this time.    -Neurology recommending 24-48h EEG, MRI noncontrast, will start seizure ppx pending these results. Ativan 1 prn ordered for seizure like activity. -Unclear whether patient had syncopal seizure or has seizure disorder  -Continue neurology w/u

## 2024-02-06 NOTE — DISCHARGE NOTE ANTEPARTUM - MEDICATION SUMMARY - MEDICATIONS TO STOP TAKING
I will STOP taking the medications listed below when I get home from the hospital:     mg oral tablet  -- 1 tab(s) by mouth every 6 hours   -- Do not take this drug if you are pregnant.  It is very important that you take or use this exactly as directed.  Do not skip doses or discontinue unless directed by your doctor.  May cause drowsiness or dizziness.  Obtain medical advice before taking any non-prescription drugs as some may affect the action of this medication.  Take with food or milk.    ondansetron 4 mg oral tablet, disintegrating  -- 1 tab(s) by mouth every 6 hours

## 2024-02-06 NOTE — PROGRESS NOTE ADULT - PROBLEM SELECTOR PLAN 5
-Has E. coli UTI >100K on culture. Will start abx -Has E. coli UTI >100K on culture. Will start antibiotic Rx -H/o migraines  -Recommend Tylenol 975mg q6h PRN    -Avoid NSAIDs

## 2024-02-06 NOTE — DISCHARGE NOTE ANTEPARTUM - CARE PROVIDER_API CALL
Emmett Small  Obstetrics and Gynecology  [address] [address]  Phone: [phone]  Fax: [phone]  Follow Up Time:     Rimpel, Katherinne  Obstetrics and Gynecology  [address] [place] [address]  Phone: [phone]  Fax: [phone]  Follow Up Time:     Shruthi Matos  Obstetrics and Gynecology  [facility] [place] [address]  Phone: [phone]  Fax: [phone]  Follow Up Time:    Emmett Small  Obstetrics and Gynecology  [address] [facility] [facility], [address]  [place] [address]  Phone: [phone]  Fax: [phone]  Follow Up Time:     Rimpel, Katherinne  Obstetrics and Gynecology  [address] [place] [address]  Phone: [phone]  Fax: [phone]  Follow Up Time:     Shruthi Matos  Obstetrics and Gynecology  [facility] [place] [address]  Phone: [phone]  Fax: [phone]  Follow Up Time:     Mukesh Costa  Neurology  [address]  [place] [address]  Phone: [phone]  Fax: [phone]  Follow Up Time: 1 month

## 2024-02-06 NOTE — DISCHARGE NOTE ANTEPARTUM - PLAN OF CARE
-S/p 48h of EEG monitoring with no epileptiform activity seen  -S/p MRI noncontrast with no findings  -Follow-up with neurology within * wk -Sent Keflex 500 QID to finish course of abx for E. coli UTI >100K colonies  -Will need repeat culture with PNC provider -Bedside anatomy in hospital wnl  -Cerebellum measurement c/w 2nd trimester US obtained in ED. Anticipated JIMI 6/6/24  -Will need to initiate prenatal care. Information for Dr. Emmett Small, Dr. Katherinne Rimpel, and [facility] provided in discharge providers below.  -Sent prenatal vitamins -S/p 48h of EEG monitoring with no epileptiform activity seen  -S/p MRI noncontrast with no findings  -Follow-up with neurology within 4 wk -Limited fetal anatomy evaluation normal   -Cerebellum measurement c/w 2nd trimester US obtained in ED. Anticipated JIMI 6/6/24  -Will need to initiate prenatal care. Information for Dr. Emmett Small, Dr. Katherinne Rimpel, and [facility] provided in discharge providers below.  -Sent prenatal vitamins

## 2024-02-06 NOTE — PROGRESS NOTE ADULT - TIME BILLING
Pregnancy with no prenatal care and complicated by syncopal convulsion episode versus seizure disorder, migraine headaches, and UTI.

## 2024-02-06 NOTE — DISCHARGE NOTE ANTEPARTUM - CARE PLAN
Principal Discharge DX:	Convulsion  Assessment and plan of treatment:	-S/p 48h of EEG monitoring with no epileptiform activity seen  -S/p MRI noncontrast with no findings  -Follow-up with neurology within * wk  Secondary Diagnosis:	Single pregnancy, second trimester  Secondary Diagnosis:	22 weeks gestation of pregnancy  Assessment and plan of treatment:	-Bedside anatomy in hospital wnl  -Cerebellum measurement c/w 2nd trimester US obtained in ED. Anticipated JIMI 6/6/24  -Will need to initiate prenatal care. Information for Dr. Emmett Small, Dr. Katherinne Rimpel, and [facility] provided in discharge providers below.  -Sent prenatal vitamins  Secondary Diagnosis:	Acute UTI  Assessment and plan of treatment:	-Sent Keflex 500 QID to finish course of abx for E. coli UTI >100K colonies  -Will need repeat culture with PNC provider   1 Principal Discharge DX:	Convulsion  Assessment and plan of treatment:	-S/p 48h of EEG monitoring with no epileptiform activity seen  -S/p MRI noncontrast with no findings  -Follow-up with neurology within 4 wk  Secondary Diagnosis:	Single pregnancy, second trimester  Secondary Diagnosis:	22 weeks gestation of pregnancy  Assessment and plan of treatment:	-Bedside anatomy in hospital wnl  -Cerebellum measurement c/w 2nd trimester US obtained in ED. Anticipated JIMI 6/6/24  -Will need to initiate prenatal care. Information for Dr. Emmett Small, Dr. Katherinne Rimpel, and [facility] provided in discharge providers below.  -Sent prenatal vitamins  Secondary Diagnosis:	Acute UTI  Assessment and plan of treatment:	-Sent Keflex 500 QID to finish course of abx for E. coli UTI >100K colonies  -Will need repeat culture with PNC provider   Principal Discharge DX:	Convulsion  Assessment and plan of treatment:	-S/p 48h of EEG monitoring with no epileptiform activity seen  -S/p MRI noncontrast with no findings  -Follow-up with neurology within 4 wk  Secondary Diagnosis:	Single pregnancy, second trimester  Secondary Diagnosis:	22 weeks gestation of pregnancy  Assessment and plan of treatment:	-Limited fetal anatomy evaluation normal   -Cerebellum measurement c/w 2nd trimester US obtained in ED. Anticipated JIMI 6/6/24  -Will need to initiate prenatal care. Information for Dr. Emmett Small, Dr. Katherinne Rimpel, and [facility] provided in discharge providers below.  -Sent prenatal vitamins  Secondary Diagnosis:	Acute UTI  Assessment and plan of treatment:	-Sent Keflex 500 QID to finish course of abx for E. coli UTI >100K colonies  -Will need repeat culture with PNC provider

## 2024-02-06 NOTE — DISCHARGE NOTE ANTEPARTUM - CARE PROVIDERS DIRECT ADDRESSES
,DirectAddress_Unknown,[email or web address] ,[email or web address],[email or web address]

## 2024-02-06 NOTE — PROGRESS NOTE ADULT - SUBJECTIVE AND OBJECTIVE BOX
[facility] PROGRESS NOTE    Patient seen and examined at bedside.  ID #    18y  at 22w5d GA JIMI  by second trimester ultrasound who was BIBEMS s/p suspected seizure currently on EEG.    Subjective: She reports no seizure like activity overnight. Feeling good fetal movement, denies bleeding, denies leakage of fluid.    Vital Signs Last 24 Hrs  T(C): 36.8 (2024 04:38), Max: 37.1 (2024 17:21)  T(F): 98.3 (2024 04:38), Max: 98.8 (2024 17:21)  HR: 90 (2024 04:38) (88 - 94)  BP: 96/66 (2024 04:38) (95/58 - 103/68)  BP(mean): --  RR: 18 (2024 04:38) (17 - 18)  SpO2: 98% (2024 04:38) (97% - 99%)    Parameters below as of 2024 04:38  Patient On (Oxygen Delivery Method): room air        Gen: NAD, well-appearing   Abd: Soft, gravid  Uterus: 21cm   Ext: non-tender, non-edematous      LABS:                          12.3   12.67 )-----------( 329      ( 2024 23:35 )             36.5     02-03    136  |  102  |  5.3<L>  ----------------------------<  88  3.7   |  20.0<L>  |  0.35<L>    Ca    9.0      2024 23:35  Mg     1.7     02-03    TPro  7.2  /  Alb  3.8  /  TBili  0.2<L>  /  DBili  x   /  AST  28  /  ALT  21  /  AlkPhos  76  02-03     [facility] PROGRESS NOTE    Patient seen and examined at bedside.     18y  at 22w5d GA JIMI 24 by second trimester ultrasound. Hospitalized for suspected syncopal convulsion versus seizure disorder.  Having EEG.    Subjective: She reports no seizure like activity overnight. Feeling good fetal movement, denies bleeding, denies leakage of fluid.    Vital Signs Last 24 Hrs  T(C): 36.8 (2024 04:38), Max: 37.1 (2024 17:21)  T(F): 98.3 (2024 04:38), Max: 98.8 (2024 17:21)  HR: 90 (2024 04:38) (88 - 94)  BP: 96/66 (2024 04:38) (95/58 - 103/68)  BP(mean): --  RR: 18 (2024 04:38) (17 - 18)  SpO2: 98% (2024 04:38) (97% - 99%)    Parameters below as of 2024 04:38  Patient On (Oxygen Delivery Method): room air    Gen: NAD, well-appearing   Abd: Soft, gravid  Uterus: 21cm   Ext: non-tender, non-edematous    LABS:                        12.3   12.67 )-----------( 329      ( 2024 23:35 )             36.5     02-03    136  |  102  |  5.3<L>  ----------------------------<  88  3.7   |  20.0<L>  |  0.35<L>    Ca    9.0      2024 23:35  Mg     1.7     02-03    TPro  7.2  /  Alb  3.8  /  TBili  0.2<L>  /  DBili  x   /  AST  28  /  ALT  21  /  AlkPhos  76  02-03     [facility] PROGRESS NOTE    Patient seen and examined at bedside.     18y  at 22w5d GA JIMI 24 by second trimester ultrasound. No prenatal care. She was unaware of the pregnancy. Hospitalized for suspected syncopal seizure versus seizure disorder.  Having EEG.    Subjective: She reports no seizure like activity overnight. Feeling good fetal movement, denies bleeding, denies leakage of fluid.    Vital Signs Last 24 Hrs  T(C): 36.8 (2024 04:38), Max: 37.1 (2024 17:21)  T(F): 98.3 (2024 04:38), Max: 98.8 (2024 17:21)  HR: 90 (2024 04:38) (88 - 94)  BP: 96/66 (2024 04:38) (95/58 - 103/68)  BP(mean): --  RR: 18 (2024 04:38) (17 - 18)  SpO2: 98% (2024 04:38) (97% - 99%)    Parameters below as of 2024 04:38  Patient On (Oxygen Delivery Method): room air    Gen: NAD, well-appearing   Abd: Soft, gravid  Uterus: 21cm   Ext: non-tender, non-edematous    LABS:                        12.3   12.67 )-----------( 329      ( 2024 23:35 )             36.5     02-03    136  |  102  |  5.3<L>  ----------------------------<  88  3.7   |  20.0<L>  |  0.35<L>    Ca    9.0      2024 23:35  Mg     1.7     02-03    TPro  7.2  /  Alb  3.8  /  TBili  0.2<L>  /  DBili  x   /  AST  28  /  ALT  21  /  AlkPhos  76  02-03

## 2024-02-06 NOTE — PROGRESS NOTE ADULT - SUBJECTIVE AND OBJECTIVE BOX
[facility]                                        Neurology at [place]                                 Patel Sandy, & Bruno                                  [address]                                        [place], [place], [address]                                             [phone]        CC: Syncope vs seizure.     HPI:   The patient is a 18y Female, 22- 3/7 weeks pregnant who presented with possible new onset seizure yesterday.  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  She does not remember LOC, but remembers shaking and had tongue bite with urine incontinence.  It is nt clear if there was any significant post-ictal confusion. She denied LOC to me but in the MFM consult she told them that she had LOC.  She deies previous history of seizure and no family history of seizure.  She is at neurological baseline.  Neurology is asked to consult (AR).    Interim history:  Remains on 2 Gulden.   No further episodes.    ROS:   Denies headache or dizziness.  Denies chest pain.  Denies shortness of breath.    MEDICATIONS  (STANDING):  cephalexin 500 milliGRAM(s) Oral four times a day  prenatal multivitamin 1 Tablet(s) Oral daily    Vital Signs Last 24 Hrs  T(C): 36.6 (06 Feb 2024 09:40), Max: 37.1 (05 Feb 2024 17:21)  T(F): 97.9 (06 Feb 2024 09:40), Max: 98.8 (05 Feb 2024 17:21)  HR: 91 (06 Feb 2024 09:40) (89 - 94)  BP: 97/62 (06 Feb 2024 09:40) (95/58 - 103/68)  RR: 18 (06 Feb 2024 09:40) (18 - 18)  SpO2: 98% (06 Feb 2024 09:40) (97% - 99%)    Parameters below as of 06 Feb 2024 09:40  Patient On (Oxygen Delivery Method): room air    Detailed Neurologic Exam:    Mental status: The patient is awake and alert. There is no aphasia. There is no dysarthria.     Cranial nerves: Pupils equal and react symmetrically to light. There is no visual field deficit to threat. Extraocular motion is full with no nystagmus. Facial sensation is intact. Facial musculature is symmetric. Palate elevates symmetrically. Tongue is midline.    Motor: There is normal bulk and tone.  There is no tremor.  Strength grossly 5/5 bilaterally.    Sensation: Grossly intact to light touch and pin.    Reflexes: 2+ throughout and plantar responses are flexor.    Cerebellar: No dysmetria on finger nose testing.    Labs:       EEG normal

## 2024-02-06 NOTE — DISCHARGE NOTE ANTEPARTUM - NS MD DC FALL RISK RISK
For information on Fall & Injury Prevention, visit: [email or web address] OR  [email or web address] OR  https://www.cdc.gov/steadi/patient.html

## 2024-02-06 NOTE — DISCHARGE NOTE ANTEPARTUM - MEDICATION SUMMARY - MEDICATIONS TO TAKE
I will START or STAY ON the medications listed below when I get home from the hospital:    Prenatal Multivitamins with Folic Acid 1 mg oral tablet  -- 1 tab(s) by mouth once a day  -- Indication: For Healthy mom and baby   I will START or STAY ON the medications listed below when I get home from the hospital:    cephalexin 500 mg oral capsule  -- 1 cap(s) by mouth 4 times a day  -- Indication: For UTI in pregnancy    Prenatal Multivitamins with Folic Acid 1 mg oral tablet  -- 1 tab(s) by mouth once a day  -- Indication: For Healthy mom and baby

## 2024-02-06 NOTE — DISCHARGE NOTE ANTEPARTUM - PATIENT PORTAL LINK FT
You can access the FollowMyHealth Patient Portal offered by [facility] by registering at the following website: [email or web address]. By joining [employer]’s FollowMyHealth portal, you will also be able to view your health information using other applications (apps) compatible with our system.

## 2024-02-06 NOTE — PROGRESS NOTE ADULT - PROBLEM SELECTOR PLAN 2
-H/o migraines  -Recommend Tylenol 975mg q6h PRN    -Avoid NSAIDs. -Unaware of pregnancy until this hospitalization  -Given referral of OB providers to establish care

## 2024-02-06 NOTE — PROGRESS NOTE ADULT - PROBLEM SELECTOR PLAN 1
-No labor complaints   -FH present  -Prenatal labs wnl: Rubella immune, Rubeola immune, HIV nonreactive, RPR negative, HbsAG non reactive  -Rh positive  -S/p bedside anatomy scan yesterday cerebellum approximately 23w1d - will keep second trimester US dating for   -No indications for  corticosteroids for fetal lung maturity or MgSO4 for fetal neuroprotection at this time. -No labor complaints   -FH present  -Prenatal labs wnl: Rubella immune, Rubeola immune, HIV nonreactive, RPR negative, HbsAG non reactive  -Rh positive  -S/p bedside anatomy scan yesterday cerebellum approximately 23w1d - will keep second trimester US dating for   -No indications for  corticosteroids for fetal lung maturity or MgSO4 for fetal neuroprotection at this time

## 2024-02-06 NOTE — PROGRESS NOTE ADULT - PROBLEM SELECTOR PLAN 4
-SCDs while in bed. -SCDs while in bed  -prenatal vitamins -BPs WNL (96/67) recommend serial blood pressures while admitted.   -HELLP labs WNL. No further HELLP labs indicated  -Seizure precautions   -Low suspicion for preeclampsia/eclampsia at this time.    -Neurology recommending 24-48h EEG, MRI noncontrast, will start seizure ppx pending these results. Ativan 1 prn ordered for seizure like activity.

## 2024-02-06 NOTE — PROGRESS NOTE ADULT - ASSESSMENT
18 year old woman who is 22 + weeks pregnant. Now status post episode of syncope vs seizure.    Syncope vs seizure  Will discontinue c-EEG.  Await brain MRI.  If any significant abnormality on above, will need antiseizure drug (Keppra 500 mg BID to start).   If she has further seizure I would suggest a 2000 mg IV levetiracetam loading dose followed by 500 mg po bid.

## 2024-02-06 NOTE — DISCHARGE NOTE ANTEPARTUM - PROVIDER TOKENS
PROVIDER:[TOKEN:[[id number]]],PROVIDER:[TOKEN:[[id number]]],PROVIDER:[TOKEN:[[id number]]] PROVIDER:[TOKEN:[[id number]]],PROVIDER:[TOKEN:[[id number]]],PROVIDER:[TOKEN:[[id number]]],PROVIDER:[TOKEN:[[id number]],FOLLOWUP:[1 month]]

## 2024-02-06 NOTE — EEG REPORT - NS EEG TEXT BOX
EDMAR HOLGUIN [id number]     Study Date: 02-05-24 0800 02-06-24 0800  Duration x Hours: 24h   --------------------------------------------------------------------------------------------------  History:  CC/ HPI Patient is a 18y old  Female who presents with a chief complaint of LOC.   MEDICATIONS  (STANDING):  prenatal multivitamin 1 Tablet(s) Oral daily    --------------------------------------------------------------------------------------------------  Study Interpretation:    [[[Abbreviation Key:  PDR=alpha rhythm/posterior dominant rhythm. A-P=anterior posterior.  Amplitude: ‘very low’:<20; ‘low’:20-49; ‘medium’:; ‘high’:>150uV.  Persistence for periodic/rhythmic patterns (% of epoch) ‘rare’:<1%; ‘occasional’:1-10%; ‘frequent’:10-50%; ‘abundant’:50-90%; ‘continuous’:>90%.  Persistence for sporadic discharges: ‘rare’:<1/hr; ‘occasional’:1/min-1/hr; ‘frequent’:>1/min; ‘abundant’:>1/10 sec.  RPP=rhythmic and periodic patterns; GRDA=generalized rhythmic delta activity; FIRDA=frontal intermittent GRDA; LRDA=lateralized rhythmic delta activity; TIRDA=temporal intermittent rhythmic delta activity;  LPD=PLED=lateralized periodic discharges; GPD=generalized periodic discharges; BIPDs =bilateral independent periodic discharges; Mf=multifocal; SIRPDs=stimulus induced rhythmic, periodic, or ictal appearing discharges; BIRDs=brief potentially ictal rhythmic discharges >4 Hz, lasting .5-10s; PFA (paroxysmal bursts >13 Hz or =8 Hz <10s).  Modifiers: +F=with fast component; +S=with spike component; +R=with rhythmic component.  S-B=burst suppression pattern.  Max=maximal. N1-drowsy; N2-stage II sleep; N3-slow wave sleep. SSS/BETS=small sharp spikes/benign epileptiform transients of sleep. HV=hyperventilation; PS=photic stimulation]]]    Daily EEG Visual Analysis    FINDINGS:      Background:  Continuity: continuous  Symmetry: symmetric  PDR: 11 Hz activity, with amplitude to 40 uV, that attenuated to eye opening.  Low amplitude frontal beta noted in wakefulness.  Reactivity: present  Voltage: normal, mostly 20-150uV  Anterior Posterior Gradient: present  Other background findings: none  Breach: absent    Background Slowing:  Generalized slowing: none was present.  Focal slowing: none was present.    State Changes:   -Drowsiness noted with increased slowing, attenuation of fast activity, vertex transients.  -Present with N2 sleep transients with symmetric spindles and K-complexes.    Sporadic Epileptiform Discharges:    None    Rhythmic and Periodic Patterns (RPPs):  None     Electrographic and Electroclinical seizures:  None    Other Clinical Events:  None    Activation Procedures:   -Hyperventilation was not performed.    -Photic stimulation was not performed.    Artifacts:  Intermittent myogenic and movement artifacts were noted.    ECG:  The heart rate on single channel ECG was predominantly between 70 to 90 BPM.    EEG Classification / Summary:  Normal  EEG in the awake / drowsy / asleep state(s).    Clinical Impression:  There were no epileptiform abnormalities recorded.      This is fellow preliminary read, pending attending review.  -------------------------------------------------------------------------------------------------------  [facility] EEG Reading Room Ph#: [phone]  Epilepsy Answering Service after 5PM and before 8:30AM: Ph#: [phone]    EVANS Canales  Epilepsy Fellow   EDMAR HOLGUIN [id number]     Study Date: 02-05-24 0800 02-06-24 0800  Duration x Hours: 24h   --------------------------------------------------------------------------------------------------  History:  CC/ HPI Patient is a 18y old  Female who presents with a chief complaint of LOC.   MEDICATIONS  (STANDING):  prenatal multivitamin 1 Tablet(s) Oral daily    --------------------------------------------------------------------------------------------------  Study Interpretation:    [[[Abbreviation Key:  PDR=alpha rhythm/posterior dominant rhythm. A-P=anterior posterior.  Amplitude: ‘very low’:<20; ‘low’:20-49; ‘medium’:; ‘high’:>150uV.  Persistence for periodic/rhythmic patterns (% of epoch) ‘rare’:<1%; ‘occasional’:1-10%; ‘frequent’:10-50%; ‘abundant’:50-90%; ‘continuous’:>90%.  Persistence for sporadic discharges: ‘rare’:<1/hr; ‘occasional’:1/min-1/hr; ‘frequent’:>1/min; ‘abundant’:>1/10 sec.  RPP=rhythmic and periodic patterns; GRDA=generalized rhythmic delta activity; FIRDA=frontal intermittent GRDA; LRDA=lateralized rhythmic delta activity; TIRDA=temporal intermittent rhythmic delta activity;  LPD=PLED=lateralized periodic discharges; GPD=generalized periodic discharges; BIPDs =bilateral independent periodic discharges; Mf=multifocal; SIRPDs=stimulus induced rhythmic, periodic, or ictal appearing discharges; BIRDs=brief potentially ictal rhythmic discharges >4 Hz, lasting .5-10s; PFA (paroxysmal bursts >13 Hz or =8 Hz <10s).  Modifiers: +F=with fast component; +S=with spike component; +R=with rhythmic component.  S-B=burst suppression pattern.  Max=maximal. N1-drowsy; N2-stage II sleep; N3-slow wave sleep. SSS/BETS=small sharp spikes/benign epileptiform transients of sleep. HV=hyperventilation; PS=photic stimulation]]]    Daily EEG Visual Analysis    FINDINGS:      Background:  Continuity: continuous  Symmetry: symmetric  PDR: 11 Hz activity, with amplitude to 40 uV, that attenuated to eye opening.  Low amplitude frontal beta noted in wakefulness.  Reactivity: present  Voltage: normal, mostly 20-150uV  Anterior Posterior Gradient: present  Other background findings: none  Breach: absent    Background Slowing:  Generalized slowing: none was present.  Focal slowing: none was present.    State Changes:   -Drowsiness noted with increased slowing, attenuation of fast activity, vertex transients.  -Present with N2 sleep transients with symmetric spindles and K-complexes.    Sporadic Epileptiform Discharges:    None    Rhythmic and Periodic Patterns (RPPs):  None     Electrographic and Electroclinical seizures:  None    Other Clinical Events:  None    Activation Procedures:   -Hyperventilation was not performed.    -Photic stimulation was not performed.    Artifacts:  Intermittent myogenic and movement artifacts were noted.    ECG:  The heart rate on single channel ECG was predominantly between 70 to 90 BPM.    EEG Classification / Summary:  Normal  EEG in the awake / drowsy / asleep state(s).    Clinical Impression:  No definite epileptiform pattern or seizures recorded x 2 days, which does not exclude the diagnosis of a seizure disorder.      -------------------------------------------------------------------------------------------------------  [facility] EEG Reading Room Ph#: [phone]  Epilepsy Answering Service after 5PM and before 8:30AM: Ph#: [phone]    EVANS Canales  Epilepsy Fellow   EDMAR HOLGUIN [id number]     Study Date: 02-05-24 0800 02-06-24 1230  Duration x Hours: 28.5 h   --------------------------------------------------------------------------------------------------  History:  CC/ HPI Patient is a 18y old  Female who presents with a chief complaint of LOC.   MEDICATIONS  (STANDING):  prenatal multivitamin 1 Tablet(s) Oral daily    --------------------------------------------------------------------------------------------------  Study Interpretation:    [[[Abbreviation Key:  PDR=alpha rhythm/posterior dominant rhythm. A-P=anterior posterior.  Amplitude: ‘very low’:<20; ‘low’:20-49; ‘medium’:; ‘high’:>150uV.  Persistence for periodic/rhythmic patterns (% of epoch) ‘rare’:<1%; ‘occasional’:1-10%; ‘frequent’:10-50%; ‘abundant’:50-90%; ‘continuous’:>90%.  Persistence for sporadic discharges: ‘rare’:<1/hr; ‘occasional’:1/min-1/hr; ‘frequent’:>1/min; ‘abundant’:>1/10 sec.  RPP=rhythmic and periodic patterns; GRDA=generalized rhythmic delta activity; FIRDA=frontal intermittent GRDA; LRDA=lateralized rhythmic delta activity; TIRDA=temporal intermittent rhythmic delta activity;  LPD=PLED=lateralized periodic discharges; GPD=generalized periodic discharges; BIPDs =bilateral independent periodic discharges; Mf=multifocal; SIRPDs=stimulus induced rhythmic, periodic, or ictal appearing discharges; BIRDs=brief potentially ictal rhythmic discharges >4 Hz, lasting .5-10s; PFA (paroxysmal bursts >13 Hz or =8 Hz <10s).  Modifiers: +F=with fast component; +S=with spike component; +R=with rhythmic component.  S-B=burst suppression pattern.  Max=maximal. N1-drowsy; N2-stage II sleep; N3-slow wave sleep. SSS/BETS=small sharp spikes/benign epileptiform transients of sleep. HV=hyperventilation; PS=photic stimulation]]]    Daily EEG Visual Analysis    FINDINGS:      Background:  Continuity: continuous  Symmetry: symmetric  PDR: 11 Hz activity, with amplitude to 40 uV, that attenuated to eye opening.  Low amplitude frontal beta noted in wakefulness.  Reactivity: present  Voltage: normal, mostly 20-150uV  Anterior Posterior Gradient: present  Other background findings: none  Breach: absent    Background Slowing:  Generalized slowing: none was present.  Focal slowing: none was present.    State Changes:   -Drowsiness noted with increased slowing, attenuation of fast activity, vertex transients.  -Present with N2 sleep transients with symmetric spindles and K-complexes.    Sporadic Epileptiform Discharges:    None    Rhythmic and Periodic Patterns (RPPs):  None     Electrographic and Electroclinical seizures:  None    Other Clinical Events:  None    Activation Procedures:   -Hyperventilation was not performed.    -Photic stimulation was not performed.    Artifacts:  Intermittent myogenic and movement artifacts were noted.    ECG:  The heart rate on single channel ECG was predominantly between 70 to 90 BPM.    EEG Classification / Summary:  Normal  EEG in the awake / drowsy / asleep state(s).    Clinical Impression:  No definite epileptiform pattern or seizures recorded x 2 days, which does not exclude the diagnosis of a seizure disorder.      -------------------------------------------------------------------------------------------------------  [facility] EEG Reading Room Ph#: [phone]  Epilepsy Answering Service after 5PM and before 8:30AM: Ph#: [phone]    EVANS Canales  Epilepsy Fellow

## 2024-02-07 ENCOUNTER — TRANSCRIPTION ENCOUNTER (OUTPATIENT)
Age: 19
End: 2024-02-07

## 2024-02-07 VITALS
SYSTOLIC BLOOD PRESSURE: 102 MMHG | RESPIRATION RATE: 17 BRPM | HEART RATE: 100 BPM | OXYGEN SATURATION: 98 % | DIASTOLIC BLOOD PRESSURE: 66 MMHG | TEMPERATURE: 98 F

## 2024-02-07 LAB
GAMMA INTERFERON BACKGROUND BLD IA-ACNC: 0.02 IU/ML — SIGNIFICANT CHANGE UP
M TB IFN-G BLD-IMP: NEGATIVE — SIGNIFICANT CHANGE UP
M TB IFN-G CD4+ BCKGRND COR BLD-ACNC: 0.04 IU/ML — SIGNIFICANT CHANGE UP
M TB IFN-G CD4+CD8+ BCKGRND COR BLD-ACNC: 0.05 IU/ML — SIGNIFICANT CHANGE UP
QUANT TB PLUS MITOGEN MINUS NIL: >10 IU/ML — SIGNIFICANT CHANGE UP

## 2024-02-07 PROCEDURE — 86480 TB TEST CELL IMMUN MEASURE: CPT

## 2024-02-07 PROCEDURE — 86850 RBC ANTIBODY SCREEN: CPT

## 2024-02-07 PROCEDURE — 86762 RUBELLA ANTIBODY: CPT

## 2024-02-07 PROCEDURE — 95700 EEG CONT REC W/VID EEG TECH: CPT

## 2024-02-07 PROCEDURE — 95714 VEEG EA 12-26 HR UNMNTR: CPT

## 2024-02-07 PROCEDURE — 76377 3D RENDER W/INTRP POSTPROCES: CPT

## 2024-02-07 PROCEDURE — 87086 URINE CULTURE/COLONY COUNT: CPT

## 2024-02-07 PROCEDURE — T1013: CPT

## 2024-02-07 PROCEDURE — 86765 RUBEOLA ANTIBODY: CPT

## 2024-02-07 PROCEDURE — 99232 SBSQ HOSP IP/OBS MODERATE 35: CPT | Mod: GC

## 2024-02-07 PROCEDURE — 70450 CT HEAD/BRAIN W/O DYE: CPT | Mod: ME

## 2024-02-07 PROCEDURE — 85730 THROMBOPLASTIN TIME PARTIAL: CPT

## 2024-02-07 PROCEDURE — 82570 ASSAY OF URINE CREATININE: CPT

## 2024-02-07 PROCEDURE — 84702 CHORIONIC GONADOTROPIN TEST: CPT

## 2024-02-07 PROCEDURE — 85025 COMPLETE CBC W/AUTO DIFF WBC: CPT

## 2024-02-07 PROCEDURE — 86900 BLOOD TYPING SEROLOGIC ABO: CPT

## 2024-02-07 PROCEDURE — 87186 SC STD MICRODIL/AGAR DIL: CPT

## 2024-02-07 PROCEDURE — 82550 ASSAY OF CK (CPK): CPT

## 2024-02-07 PROCEDURE — 85384 FIBRINOGEN ACTIVITY: CPT

## 2024-02-07 PROCEDURE — 81001 URINALYSIS AUTO W/SCOPE: CPT

## 2024-02-07 PROCEDURE — 96374 THER/PROPH/DIAG INJ IV PUSH: CPT

## 2024-02-07 PROCEDURE — 83615 LACTATE (LD) (LDH) ENZYME: CPT

## 2024-02-07 PROCEDURE — 96375 TX/PRO/DX INJ NEW DRUG ADDON: CPT

## 2024-02-07 PROCEDURE — 70551 MRI BRAIN STEM W/O DYE: CPT

## 2024-02-07 PROCEDURE — 86901 BLOOD TYPING SEROLOGIC RH(D): CPT

## 2024-02-07 PROCEDURE — 80307 DRUG TEST PRSMV CHEM ANLYZR: CPT

## 2024-02-07 PROCEDURE — 86703 HIV-1/HIV-2 1 RESULT ANTBDY: CPT

## 2024-02-07 PROCEDURE — 85610 PROTHROMBIN TIME: CPT

## 2024-02-07 PROCEDURE — 99285 EMERGENCY DEPT VISIT HI MDM: CPT

## 2024-02-07 PROCEDURE — 76805 OB US >/= 14 WKS SNGL FETUS: CPT

## 2024-02-07 PROCEDURE — G1004: CPT

## 2024-02-07 PROCEDURE — 99232 SBSQ HOSP IP/OBS MODERATE 35: CPT

## 2024-02-07 PROCEDURE — 86780 TREPONEMA PALLIDUM: CPT

## 2024-02-07 PROCEDURE — 82962 GLUCOSE BLOOD TEST: CPT

## 2024-02-07 PROCEDURE — 84156 ASSAY OF PROTEIN URINE: CPT

## 2024-02-07 PROCEDURE — 87340 HEPATITIS B SURFACE AG IA: CPT

## 2024-02-07 PROCEDURE — 80053 COMPREHEN METABOLIC PANEL: CPT

## 2024-02-07 PROCEDURE — 83735 ASSAY OF MAGNESIUM: CPT

## 2024-02-07 PROCEDURE — 84550 ASSAY OF BLOOD/URIC ACID: CPT

## 2024-02-07 PROCEDURE — 36415 COLL VENOUS BLD VENIPUNCTURE: CPT

## 2024-02-07 PROCEDURE — 93005 ELECTROCARDIOGRAM TRACING: CPT

## 2024-02-07 RX ORDER — CEPHALEXIN 500 MG
1 CAPSULE ORAL
Qty: 16 | Refills: 0
Start: 2024-02-07 | End: 2024-02-10

## 2024-02-07 RX ADMIN — Medication 500 MILLIGRAM(S): at 05:31

## 2024-02-07 RX ADMIN — Medication 500 MILLIGRAM(S): at 11:33

## 2024-02-07 RX ADMIN — Medication 500 MILLIGRAM(S): at 18:35

## 2024-02-07 RX ADMIN — Medication 1 TABLET(S): at 11:33

## 2024-02-07 NOTE — PROGRESS NOTE ADULT - PROBLEM SELECTOR PLAN 1
-No labor complaints   -FH present  -Prenatal labs wnl: Rubella immune, Rubeola immune, HIV nonreactive, RPR negative, HbsAG non reactive  -Rh positive  -S/p bedside anatomy scan yesterday cerebellum approximately 23w1d - will keep second trimester US dating for   -No indications for  corticosteroids for fetal lung maturity or MgSO4 for fetal neuroprotection at this time

## 2024-02-07 NOTE — PROGRESS NOTE ADULT - ASSESSMENT
A/P: 18y  at 22w6d GA JIMI 24 by second trimester ultrasound. Hospitalized for suspected syncopal seizure versus seizure disorder s/p negative EEG over 48h and negative MRI. Neurology following patient.    Anticipate discharge home s/p clearance by neurology

## 2024-02-07 NOTE — PROGRESS NOTE ADULT - ASSESSMENT
19 y/o F who is currently 22 weeks pregnant who presented with episode of LOC.  cEEG was negative and MRI is also negative.  At this time, lower suspicion of neurological etiology.      No further inpatient workup at this time  May follow up with Dr. Costa at discharge

## 2024-02-07 NOTE — PROGRESS NOTE ADULT - ATTENDING COMMENTS
MFM - IUP at 22w4d admitted for evaluation and management of new onset seizure in pregnancy. Video EEG in progress.  Patient reports no overnight events.  No obstetric or constitutional complaint.  No evidence of hypertensive disorder in pregnancy.  Will perform ultrasound to confirm dating with anatomy evaluation.  Plan for MRI without contrast after EEG per discussion with Neurology. Appreciate recommendations. Will provide OB referral for follow up after discharge. Will follow.  Amelia Morrell MD  Maternal Fetal Medicine
Pregnancy with no prenatal care and complicated by syncopal convulsion episode versus seizure disorder, migraine headaches, and UTI. Complete neurology w/u. Treat UTI with antibiotics.
MFM - IUP at 22w6d admitted for evaluation of new onset seizure activity in pregnancy.  Patient without prenatal care until this admission.  No OB complaint.  Pregnancy dating confirmed and fetal anatomy evaluation reassuring  EEG and head imaging without abnormality. No further seizure activity observed during hospitalization.  Appreciate Neurology recommendations.  Patient stable for outpatient management from OB standpoint. Provided referral information to establish care with OB provider. Will need to continue outpatient therapy for UTI.    Amelia Morrell MD  Maternal Fetal Medicine

## 2024-02-07 NOTE — PROGRESS NOTE ADULT - SUBJECTIVE AND OBJECTIVE BOX
S: No overnight events. Patient to be discharged today.      O:   Vital Signs Last 24 Hrs  T(C): 36.4 (07 Feb 2024 12:14), Max: 36.9 (06 Feb 2024 21:50)  T(F): 97.5 (07 Feb 2024 12:14), Max: 98.4 (06 Feb 2024 21:50)  HR: 108 (07 Feb 2024 12:14) (85 - 108)  BP: 97/62 (07 Feb 2024 12:14) (96/63 - 107/69)  BP(mean): --  RR: 17 (07 Feb 2024 12:14) (14 - 18)  SpO2: 98% (07 Feb 2024 12:14) (96% - 98%)    Parameters below as of 07 Feb 2024 12:14  Patient On (Oxygen Delivery Method): room air    Examination:  General: Cooperative, NAD   HEENT: NC/AT, no carotid bruits,   Lungs: CTAB  Chest: RRR, no murmurs  Extremities: nontender, no erythema    Neurological Examination:  MS: AOx3. Appropriately interactive, normal affect. Speech fluent w/o paraphasic error, repetition, naming, reading is intact   CN: No Papilledema, PERLL, EOMI, V1-3 sensation intact, face symmetric, hearing intact, palate elevates symmetrically, tongue midline, SCM equal bilaterally  Motor: normal bulk and tone, no tremor, rigidity or bradykinesia.  5/5 all over   Sens: Intact to light touch.    Reflexes: 2/4 all over, downgoing toes b/l  Coord:  No dysmetria, JADE intact   Gait: Normal  NIHSS:***      Testing:  EEG Classification / Summary:  Normal  EEG in the awake / drowsy / asleep state(s).    Clinical Impression:  No definite epileptiform pattern or seizures recorded x 2 days, which does not exclude the diagnosis of a seizure disorder.        MRI Brain:   IMPRESSION: No evidence for cortical dysplasia, gray matter heterotopia,   or mesial temporal sclerosis.       S: No overnight events. Has not had anymore events Patient to be discharged today.      O:   Vital Signs Last 24 Hrs  T(C): 36.4 (07 Feb 2024 12:14), Max: 36.9 (06 Feb 2024 21:50)  T(F): 97.5 (07 Feb 2024 12:14), Max: 98.4 (06 Feb 2024 21:50)  HR: 108 (07 Feb 2024 12:14) (85 - 108)  BP: 97/62 (07 Feb 2024 12:14) (96/63 - 107/69)  BP(mean): --  RR: 17 (07 Feb 2024 12:14) (14 - 18)  SpO2: 98% (07 Feb 2024 12:14) (96% - 98%)    Parameters below as of 07 Feb 2024 12:14  Patient On (Oxygen Delivery Method): room air    Examination:  General: Cooperative, NAD   HEENT: NC/AT, no carotid bruits,   Lungs: CTAB  Chest: RRR, no murmurs  Extremities: nontender, no erythema    Neurological Examination:  MS: AOx3. Appropriately interactive, normal affect. Speech fluent w/o paraphasic error, repetition, naming, reading is intact   CN: , PERLL, EOMI, V1-3 sensation intact, face symmetric, hearing intact, palate elevates symmetrically, tongue midline, SCM equal bilaterally  Motor: normal bulk and tone, no tremor, rigidity or bradykinesia.  5/5 all over   Sens: Intact to light touch.    Reflexes: 2/4 all over, downgoing toes b/l  Coord:  No dysmetria, JADE intact   Gait: Normal        Testing:  EEG Classification / Summary:  Normal  EEG in the awake / drowsy / asleep state(s).    Clinical Impression:  No definite epileptiform pattern or seizures recorded x 2 days, which does not exclude the diagnosis of a seizure disorder.        MRI Brain:   IMPRESSION: No evidence for cortical dysplasia, gray matter heterotopia,   or mesial temporal sclerosis.

## 2024-02-07 NOTE — DISCHARGE NOTE NURSING/CASE MANAGEMENT/SOCIAL WORK - PATIENT PORTAL LINK FT
You can access the FollowMyHealth Patient Portal offered by [facility] by registering at the following website: [email or web address]. By joining [employer]’s FollowMyHealth portal, you will also be able to view your health information using other applications (apps) compatible with our system.

## 2024-02-07 NOTE — PROGRESS NOTE ADULT - SUBJECTIVE AND OBJECTIVE BOX
[facility] PROGRESS NOTE    Patient seen and examined at bedside.     18y  at 22w6d GA JIMI 24 by second trimester ultrasound. No prenatal care. She was unaware of the pregnancy. Hospitalized for suspected syncopal seizure versus seizure disorder s/p negative EEG over 48h and negative MRI.    Subjective: She reports no seizure like activity overnight. Feeling good fetal movement, denies bleeding, denies leakage of fluid.    Vital Signs Last 24 Hrs  T(C): 36.6 (2024 05:00), Max: 36.9 (2024 13:15)  T(F): 97.8 (2024 05:00), Max: 98.5 (2024 13:15)  HR: 96 (2024 05:00) (82 - 96)  BP: 100/68 (2024 05:00) (94/58 - 107/69)  BP(mean): --  RR: 14 (2024 05:00) (14 - 18)  SpO2: 96% (2024 05:00) (96% - 98%)    Parameters below as of 2024 05:00  Patient On (Oxygen Delivery Method): room air        Gen: NAD, well-appearing   Abd: Soft, gravid  Uterus: 21cm   Ext: non-tender, non-edematous    LABS:                        12.3   12.67 )-----------( 329      ( 2024 23:35 )             36.5     02-03    136  |  102  |  5.3<L>  ----------------------------<  88  3.7   |  20.0<L>  |  0.35<L>    Ca    9.0      2024 23:35  Mg     1.7     02-03    TPro  7.2  /  Alb  3.8  /  TBili  0.2<L>  /  DBili  x   /  AST  28  /  ALT  21  /  AlkPhos  76  02-03

## 2024-02-07 NOTE — PROGRESS NOTE ADULT - PROBLEM SELECTOR PLAN 6
-Has E. coli UTI >100K on culture. Started on Kefflex 500 QID.
-Has E. coli UTI >100K on culture. Will start antibiotic Rx

## 2024-02-28 PROBLEM — Z00.00 ENCOUNTER FOR PREVENTIVE HEALTH EXAMINATION: Status: ACTIVE | Noted: 2024-02-28

## 2024-03-05 ENCOUNTER — APPOINTMENT (OUTPATIENT)
Dept: NEUROLOGY | Facility: CLINIC | Age: 19
End: 2024-03-05
Payer: MEDICAID

## 2024-03-05 VITALS
WEIGHT: 146 LBS | HEART RATE: 88 BPM | BODY MASS INDEX: 27.56 KG/M2 | SYSTOLIC BLOOD PRESSURE: 100 MMHG | OXYGEN SATURATION: 98 % | HEIGHT: 61 IN | DIASTOLIC BLOOD PRESSURE: 64 MMHG

## 2024-03-05 DIAGNOSIS — R56.9 UNSPECIFIED CONVULSIONS: ICD-10-CM

## 2024-03-05 PROCEDURE — 99215 OFFICE O/P EST HI 40 MIN: CPT

## 2024-03-05 NOTE — REASON FOR VISIT
[Post Hospitalization] : a post hospitalization visit [[employer] ] : provided by [employer]   [Source: ______] : History obtained from [unfilled]

## 2024-03-06 ENCOUNTER — APPOINTMENT (OUTPATIENT)
Dept: ANTEPARTUM | Facility: CLINIC | Age: 19
End: 2024-03-06
Payer: MEDICAID

## 2024-03-06 ENCOUNTER — ASOB RESULT (OUTPATIENT)
Age: 19
End: 2024-03-06

## 2024-03-06 PROCEDURE — 76811 OB US DETAILED SNGL FETUS: CPT

## 2024-03-06 PROCEDURE — 76817 TRANSVAGINAL US OBSTETRIC: CPT

## 2024-03-12 ENCOUNTER — APPOINTMENT (OUTPATIENT)
Dept: NEUROLOGY | Facility: CLINIC | Age: 19
End: 2024-03-12
Payer: MEDICAID

## 2024-03-12 ENCOUNTER — NON-APPOINTMENT (OUTPATIENT)
Age: 19
End: 2024-03-12

## 2024-03-12 PROCEDURE — 93040 RHYTHM ECG WITH REPORT: CPT

## 2024-03-12 PROCEDURE — 95819 EEG AWAKE AND ASLEEP: CPT

## 2024-03-16 ENCOUNTER — OUTPATIENT (OUTPATIENT)
Dept: INPATIENT UNIT | Facility: HOSPITAL | Age: 19
LOS: 1 days | End: 2024-03-16
Payer: MEDICAID

## 2024-03-16 VITALS
HEART RATE: 107 BPM | RESPIRATION RATE: 16 BRPM | DIASTOLIC BLOOD PRESSURE: 63 MMHG | SYSTOLIC BLOOD PRESSURE: 104 MMHG | TEMPERATURE: 98 F

## 2024-03-16 VITALS
TEMPERATURE: 98 F | SYSTOLIC BLOOD PRESSURE: 104 MMHG | HEART RATE: 107 BPM | RESPIRATION RATE: 16 BRPM | DIASTOLIC BLOOD PRESSURE: 63 MMHG

## 2024-03-16 DIAGNOSIS — O26.899 OTHER SPECIFIED PREGNANCY RELATED CONDITIONS, UNSPECIFIED TRIMESTER: ICD-10-CM

## 2024-03-16 PROCEDURE — G0463: CPT

## 2024-03-16 PROCEDURE — 59025 FETAL NON-STRESS TEST: CPT

## 2024-03-16 NOTE — OB RN TRIAGE NOTE - NSSDOHINSULT_OBGYN_A_OB
----- Message from Delicia Echevarria DO sent at 1/18/2021 11:41 AM EST -----  Good morning, I saw this patient in ED and she is COVID positive  She seems to meet high risk criteria and may be a candidate for monoclonal ab  She is being discharged and does not have a primary provider  MRN is [id number]  Thank you for your help with this patient  never

## 2024-03-16 NOTE — OB PROVIDER TRIAGE NOTE - NSHPPHYSICALEXAM_GEN_ALL_CORE
Vitals  HR: 100 (03-16-24 @ 18:47) (100 - 107)  BP: 102/55 (03-16-24 @ 18:47) (102/55 - 104/63)    Gen: NAD, well-appearing  Heart: S1 S2, RRR  Lungs: CTAB  Abd: soft, gravid  Ext: non-edematous, non-tender   SVE: deferred    FHT: 135bpm, +accels, no decels mod variability   [place]: not kale

## 2024-03-16 NOTE — OB PROVIDER TRIAGE NOTE - HISTORY OF PRESENT ILLNESS
EDMAR SHEETS is a 18y  at 28w1d GA who presents to L&D triage for decreased fetal movement since yesterday. Here in triage, the patient is feeling the baby move often.  Pt denies vaginal bleeding, contractions and leakage of fluid.    Pt denies trauma. Her last cervical exam was . Last sexual intercourse was .     Pt denies headaches, visual disturbances, RUQ pain, epigastric pain and new-onset edema.     She denies any urinary complaints.     She denies fevers, chills, nausea, vomiting.     She denies shortness of breath, chest pain, and palpitations.    Pregnancy course is  uncomplicated.   Pregnancy course is significant for:    POB:  PGYN: -fibroids/-cysts, denied STD hx, denies abnormal PAPs  PMH:  PSH:  SH: Denies tobacco use, EtOH use and illicit drug use during the pregnancy; Feels safe at home  Meds:  All:    T(C): --  HR: 100 (24 @ 18:47) (100 - 107)  BP: 102/55 (24 @ 18:47) (102/55 - 104/63)  RR: --  SpO2: --  Gen: NAD, well-appearing  Heart: S1 S2, RRR  Lungs: CTAB  Abd: soft, gravid  Ext: non-edematous, non-tender   SVE:   SSE: cervix visualized, closed and without any signs of bleeding or drainage, no pooling   FHT:   [place]:    A/P:     Fetus:  [place]:  Dispo: Continue to observe.     Discussed with    EDMAR SHEETS is a 18y  at 28w1d GA who presents to L&D triage for decreased fetal movement since yesterday. Here in triage, the patient is feeling the baby move often.  Pt denies vaginal bleeding, contractions, or leakage of fluid.    Pt denies trauma.   Pt denies headaches, visual disturbances, RUQ pain, epigastric pain and new-onset edema.   She denies any urinary complaints.   She denies fevers, chills, nausea, vomiting.   She denies shortness of breath, chest pain, and palpitations.    Pregnancy course is uncomplicated.     POB:   PGYN: -fibroids/-cysts, denied STD hx, denies abnormal PAPs  PMH: denies  PSH: denies  SH: Denies tobacco use, EtOH use and illicit drug use during the pregnancy; Feels safe at home  Meds: PNV  All: NKDA

## 2024-03-16 NOTE — OB PROVIDER TRIAGE NOTE - ADDITIONAL INSTRUCTIONS
follow-up outpatient at [facility]. Return to hospital if you experience vaginal bleeding, contractions, leakage of fluid, or decreased fetal movements.

## 2024-03-16 NOTE — OB PROVIDER TRIAGE NOTE - NSOBPROVIDERNOTE_OBGYN_ALL_OB_FT
A/P:  19 yo  at 28w1d GA who presents to triage for decreased fetal movement.   -FHT: reactive, reassuring  -BPP: 8/8, TYE 18.3  -Patient feels more reassured after seeing her baby on sono. Pt endorsing good fetal movement in triage.  -Return precautions given.    Follow-up [facility] outpatient.    Dispo: Home    Discussed with Dr. Matos

## 2024-03-19 DIAGNOSIS — O36.8130 DECREASED FETAL MOVEMENTS, THIRD TRIMESTER, NOT APPLICABLE OR UNSPECIFIED: ICD-10-CM

## 2024-03-19 DIAGNOSIS — Z3A.28 28 WEEKS GESTATION OF PREGNANCY: ICD-10-CM

## 2024-04-03 ENCOUNTER — ASOB RESULT (OUTPATIENT)
Age: 19
End: 2024-04-03

## 2024-04-03 ENCOUNTER — APPOINTMENT (OUTPATIENT)
Dept: ANTEPARTUM | Facility: CLINIC | Age: 19
End: 2024-04-03
Payer: MEDICAID

## 2024-04-03 PROCEDURE — 76816 OB US FOLLOW-UP PER FETUS: CPT

## 2024-04-03 PROCEDURE — 76819 FETAL BIOPHYS PROFIL W/O NST: CPT

## 2024-04-17 ENCOUNTER — APPOINTMENT (OUTPATIENT)
Dept: NEUROLOGY | Facility: CLINIC | Age: 19
End: 2024-04-17
Payer: MEDICAID

## 2024-04-17 VITALS
OXYGEN SATURATION: 99 % | BODY MASS INDEX: 27.56 KG/M2 | HEART RATE: 105 BPM | WEIGHT: 146 LBS | HEIGHT: 61 IN | SYSTOLIC BLOOD PRESSURE: 98 MMHG | DIASTOLIC BLOOD PRESSURE: 62 MMHG

## 2024-04-17 DIAGNOSIS — R55 SYNCOPE AND COLLAPSE: ICD-10-CM

## 2024-04-17 PROCEDURE — 99214 OFFICE O/P EST MOD 30 MIN: CPT

## 2024-04-17 NOTE — REASON FOR VISIT
[Follow-Up: _____] : a [unfilled] follow-up visit [Post Hospitalization] : a post hospitalization visit [[employer] ] : provided by [employer]   [Source: ______] : History obtained from [unfilled] [Interpreters_IDNumber] : [id number] [Interpreters_FullName] : ALONSO [FreeTextEntry1] : seizure-like activity, syncope

## 2024-04-17 NOTE — HISTORY OF PRESENT ILLNESS
[FreeTextEntry1] : 24: Pt reports for f/u. Denies new complaints or further seizure-like activity.  She never kept a BP diary as instructed. Does report increased water intake and rare gatorade. Occasionally feels tired and sometimes lightheaded.  Otherwise denies fever, chills, HA, CP, SOB, weakness, vision, hearing issues.  BP soft today at 98/62.   BACKGROUND:: 17 yo F  at 26 wks gestation and no other reported MHx, presents for eval of seizure-like event that led to hospitalization at [facility] in 2024. 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 She was not aware that she was pregnant at that time and was discovered at the hospital that she was 22 wks along, BP was in 90s systolic and found to have UTI. She was seen by inpatient Neurologist, MRI brain epilepsy protocol as well as 48-hour VEEG monitoring were negative. She was discharged off [facility] for likely non-epileptic event.

## 2024-04-17 NOTE — ASSESSMENT
[FreeTextEntry1] : 17 yo F 26 wks pregnant presenting for post hosptialization f/u for apparent seizure-like activity. Extensive chart reviewed, imaging and records also noted negative. Suspect her event was likely an instance of convulsive syncope due to dehydration and compounded by UTI and pregnancy which she found out about  at said hosptialization. Does not drink enough water.  Doing well today besides occasional tiredness and dizziness.   PLAN: - rEEG was WNL - BP diary TID x 3 wks and f/u with PCP - f/u with PCP and OB/GYN closely advised - Re-education and counseling done extensively for importance of hydration and monitoring electrolytes (can trial gatorade if feels lightheaded or dehydrated - and seek medical attentions).  RTC PRN.   Patient's questions and concerns were addressed, she voiced understanding.  Total time spent on the day of the visit, including pre-visit and post-visit time was 45 minutes.

## 2024-05-01 ENCOUNTER — ASOB RESULT (OUTPATIENT)
Age: 19
End: 2024-05-01

## 2024-05-01 ENCOUNTER — APPOINTMENT (OUTPATIENT)
Dept: ANTEPARTUM | Facility: CLINIC | Age: 19
End: 2024-05-01
Payer: MEDICAID

## 2024-05-01 PROCEDURE — 76816 OB US FOLLOW-UP PER FETUS: CPT

## 2024-05-01 PROCEDURE — 76819 FETAL BIOPHYS PROFIL W/O NST: CPT | Mod: 59

## 2024-05-29 ENCOUNTER — APPOINTMENT (OUTPATIENT)
Dept: ANTEPARTUM | Facility: CLINIC | Age: 19
End: 2024-05-29

## 2024-06-06 ENCOUNTER — APPOINTMENT (OUTPATIENT)
Dept: ANTEPARTUM | Facility: CLINIC | Age: 19
End: 2024-06-06

## 2024-06-06 ENCOUNTER — TRANSCRIPTION ENCOUNTER (OUTPATIENT)
Age: 19
End: 2024-06-06

## 2024-06-06 ENCOUNTER — INPATIENT (INPATIENT)
Facility: HOSPITAL | Age: 19
LOS: 0 days | Discharge: HOME | End: 2024-06-06
Attending: OBSTETRICS & GYNECOLOGY | Admitting: OBSTETRICS & GYNECOLOGY

## 2024-06-06 VITALS
HEIGHT: 60 IN | SYSTOLIC BLOOD PRESSURE: 111 MMHG | HEART RATE: 100 BPM | DIASTOLIC BLOOD PRESSURE: 65 MMHG | WEIGHT: 171.96 LBS | RESPIRATION RATE: 18 BRPM | TEMPERATURE: 97 F

## 2024-06-06 VITALS — SYSTOLIC BLOOD PRESSURE: 111 MMHG | DIASTOLIC BLOOD PRESSURE: 65 MMHG | HEART RATE: 100 BPM

## 2024-06-06 DIAGNOSIS — O26.899 OTHER SPECIFIED PREGNANCY RELATED CONDITIONS, UNSPECIFIED TRIMESTER: ICD-10-CM

## 2024-06-06 RX ORDER — SODIUM CHLORIDE 9 MG/ML
1000 INJECTION, SOLUTION INTRAVENOUS
Refills: 0 | Status: DISCONTINUED | OUTPATIENT
Start: 2024-06-06 | End: 2024-06-06

## 2024-06-06 RX ORDER — CHLORHEXIDINE GLUCONATE 213 G/1000ML
1 SOLUTION TOPICAL DAILY
Refills: 0 | Status: DISCONTINUED | OUTPATIENT
Start: 2024-06-06 | End: 2024-06-06

## 2024-06-06 RX ORDER — CITRIC ACID/SODIUM CITRATE 300-500 MG
30 SOLUTION, ORAL ORAL ONCE
Refills: 0 | Status: DISCONTINUED | OUTPATIENT
Start: 2024-06-06 | End: 2024-06-06

## 2024-06-06 RX ORDER — OXYTOCIN 10 UNIT/ML
333.33 VIAL (ML) INJECTION
Qty: 20 | Refills: 0 | Status: DISCONTINUED | OUTPATIENT
Start: 2024-06-06 | End: 2024-06-06

## 2024-06-06 NOTE — DISCHARGE NOTE ANTEPARTUM - NS MD DC FALL RISK RISK
For information on Fall & Injury Prevention, visit: [email or web address] OR  [email or web address] OR  https://www.cdc.gov/steadi/patient.html

## 2024-06-06 NOTE — OB PROVIDER H&P - ATTENDING COMMENTS
Pt was sent from L&D and was offered IOL   pt declined and would like to wait for natural onset of labor  fetal tracing Reactive  BPP 8/8  pt was offered IOL, risk and benefits discussed  pt would like to wait more and declined IOL today  pt is scheduled for IOL at 41wks  labor precautions given   DC home today

## 2024-06-06 NOTE — DISCHARGE NOTE ANTEPARTUM - HOSPITAL COURSE
Patient admitted for planned IOL due to nrNST in office, however, NST in triage is reactive and reassuring. Patient also desires spontaneous labor at this time. BPP performed and 8/8. Patient to be discharged with labor precautions and f/u in clinic within 1 week if undelivered.

## 2024-06-06 NOTE — OB PROVIDER H&P - HISTORY OF PRESENT ILLNESS
SUBJECTIVE:  EDMAR SHEETS is a 18y  at 36wk6d GA by LMP consistent with first trimester sono who presents to L&D from her clinic due to nrNST. Patient denies vaginal bleeding, contractions and leakage of fluid. She endorses fetal movement. Of note, patient unaware she was pregnant until 5 months when she had a suspected  seizure episode  Denies fevers, chills, nausea, vomiting, chest pain, SOB, dizziness and headache. No other complaints at this time.     Patient receives care at [facility]  JIMI: 22 by 1TS  LMP: unknown, early october    Prenatal course is significant for:  Hx seizure  Hx migraines  Late to PNC  GBS Status: negative    OBHx:   GYNHx: Denies history of fibroids, ovarian cysts, STIs, abnormal pap smears   PMHx: migraines  PSHx: Denies  SocHx: Denies EtOH, tobacco and illicit drug use during this pregnancy; feels safe at home   Meds: PNVs  Allergies: NKDA

## 2024-06-06 NOTE — DISCHARGE NOTE ANTEPARTUM - CARE PROVIDER_API CALL
Doroteo Arechiga  Obstetrics and Gynecology  [facility] [place] [address]  Phone: [phone]  Fax: [phone]  Established Patient  Follow Up Time: 1 week

## 2024-06-06 NOTE — DISCHARGE NOTE ANTEPARTUM - PATIENT PORTAL LINK FT
You can access the FollowMyHealth Patient Portal offered by [facility] by registering at the following website: [email or web address]. By joining [facility]’s FollowMyHealth portal, you will also be able to view your health information using other applications (apps) compatible with our system.

## 2024-06-06 NOTE — OB PROVIDER H&P - NSHPPHYSICALEXAM_GEN_ALL_CORE
OBJECTIVE:  Vital Signs Last 24 Hrs  HR: 100 (06 Jun 2024 13:42) (100 - 100)  BP: 111/65 (06 Jun 2024 13:42) (111/65 - 111/65)    Physical Exam:  Gen: NAD, well-appearing, AAOx3   Abd: Soft, gravid  Ext: non-tender, non-edematous  SVE: pending  Nurse present at bedside during all components of physical exam.     Bedside sono: pending  FHT: Baseline 140bpm, mod leroy, +accel, -decel  [place]: ctx isolated OBJECTIVE:  Vital Signs Last 24 Hrs  HR: 100 (06 Jun 2024 13:42) (100 - 100)  BP: 111/65 (06 Jun 2024 13:42) (111/65 - 111/65)    Physical Exam:  Gen: NAD, well-appearing, AAOx3   Abd: Soft, gravid  Ext: non-tender, non-edematous  SVE: pending  Nurse present at bedside during all components of physical exam.     Bedside sono: BPP 8/8, TYE 26.2cm, vertex anterior  FHT: Baseline 140bpm, mod leroy, +accel, -decel  [place]: ctx isolated

## 2024-06-06 NOTE — OB PROVIDER H&P - NSLOWPPHRISK_OBGYN_A_OB
No previous uterine incision/[place] Pregnancy/Less than or equal to 4 previous vaginal births/No known bleeding disorder/No history of postpartum hemorrhage/No other PPH risks indicated

## 2024-06-06 NOTE — DISCHARGE NOTE ANTEPARTUM - PLAN OF CARE
Patient planned for IOL due to nrNST in office, NST in triage reactive with BPP 8/8. Fetal status reassuring and patient electing for attempting for spontaneous labor.

## 2024-06-06 NOTE — OB RN PATIENT PROFILE - AS TEMP SITE
HOSPITALIST PROGRESS NOTE:      Follow-up for:   Chest pain with shortness of breath    *Please note the patient's past medical, past surgical, family and social histories have all been reviewed.    Subjective: The patient was seen this morning, he was resting comfortably in bed. He is awaiting coronary angiogram, he had multiple questions about the procedure which were addressed.      Examination:     Vitals:    06/16/17 1107   BP: 126/65   Pulse:    Resp: 18   Temp: 98.1 °F (36.7 °C)       General: A&Ox3 and in no acute distress  CV: heart sounds are regular. There are no murmurs, rubs or gallops  Pulm: Lungs are clear. There are no wheezes, crackles or rhonchi  Abd: Soft, non-tender and nondistended. Bowel sounds are present in all 4 quadrants  Ext: There is no clubbing, cyanosis or edema  Skin: There is no rash, ulcers or skin breakdown noted        Intake/Output Summary (Last 24 hours) at 06/16/17 1118  Last data filed at 06/16/17 0500   Gross per 24 hour   Intake              590 ml   Output              100 ml   Net              490 ml       Weight:   Wt Readings from Last 1 Encounters:   06/16/17 86.4 kg         WBC (K/mcL)   Date Value   06/15/2017 6.5     HGB (g/dL)   Date Value   06/15/2017 14.4    HCT (%)   Date Value   06/15/2017 41.6    PLT (K/mcL)   Date Value   06/15/2017 183   04/11/2013 229      Sodium (mmol/L)   Date Value   06/16/2017 143      Chloride (mmol/L)   Date Value   06/16/2017 107      BUN (mg/dL)   Date Value   06/16/2017 22 (H)      Potassium (mmol/L)   Date Value   06/16/2017 4.3    Glucose (mg/dL)   Date Value   06/16/2017 82    Creatinine (mg/dL)   Date Value   06/16/2017 1.12        Medications reviewed      Diagnostics:   *Please review complete reports in imaging on EPIC    XR Chest AP or PA [[id number]] Collected: 06/14/17 1040      Order Status: Completed Updated: 06/14/17 1053     Narrative:         XR CHEST AP OR PA    HISTORY:   chest pain    COMPARISON: None  .      FINDINGS: Cardiac size is normal.   EKG leads overlie the chest .      No acute infiltrate is identified . No pneumothorax is seen          Impression:       IMPRESSION: No acute disease.                ASSESSMENT AND PLAN:       Assessment:  Chest pain with exertional shortness of breath  Recent abnormal nuclear stress test 9/2016  Episode of hypotension after sublingual nitroglycerin   Chronic atrial fibrillation  Essential hypertension  BPH with lower urinary tract symptoms  Dyslipidemia  Chronic vertigo  Chronic pain syndrome  Cervical and lumbar radiculopathy     Plan:  Plan for coronary angiogram today. continue metoprolol, statin, aspirin. Appreciate cardiology input, n.p.o. after midnight.  Continue oral amiodarone for chronic atrial fibrillation, patient has refused anticoagulation.  Avoid usage of nitroglycerin for shortness of breath, closely monitor blood pressure.  Request PT/OT evaluation after coronary angiogram.  Anticipate discharge in a.m. if cleared by cardiology.          DVT Prophylaxis: LMWH, SCDs  Expected DC: Likely tomorrow    PLAN FOR TODAY:    Await coronary angiogram results. Bedrest for 6 hours after procedure.        Code status: Full Resuscitation      Discussed with RN: Taylor Jhaveri MD  6/16/2017   temporal

## 2024-06-06 NOTE — OB PROVIDER H&P - NSOBVTERISKREFER_OBGYN_ALL_OB
Subjective  Valencia Gerard is a 16 year old female.    Chief Complaint   Patient presents with   • UTI     blood in urine       HPI  Pt presents to office as a new patient.  C/o gross hematuria.  Has had slight pain with urination.  No fever/chills.  No back pain.  No increase in urinary frequency.  No h/o kidney stones.  LMP about 2 weeks ago.  Not pregnant.      ROS   Review of Systems   Genitourinary: Positive for hematuria.   All other systems reviewed and are negative.      Past Medical History  There is no problem list on file for this patient.      Past Surgical History  Past Surgical History:   Procedure Laterality Date   • No past surgeries         Current Medications  No current outpatient medications on file.     No current facility-administered medications for this visit.        Allergies  ALLERGIES:  No Known Allergies    Family History  Family History   Problem Relation Age of Onset   • Diabetes Father         Social History  Social History     Tobacco Use   • Smoking status: Never Smoker   • Smokeless tobacco: Never Used   Substance Use Topics   • Alcohol use: Never     Frequency: Never   • Drug use: Never         Objective  Visit Vitals  /60 (BP Location: LUE - Left upper extremity, Patient Position: Sitting, Cuff Size: Regular)   Pulse (!) 105   Temp 98.7 °F (37.1 °C) (Temporal)   Ht 5' 1\" (1.549 m)   Wt 44.9 kg (99 lb)   LMP 06/28/2020 (Approximate)   SpO2 97%   BMI 18.71 kg/m²       Physical Exam  Physical Exam   Constitutional: She is oriented to person, place, and time. She appears well-developed and well-nourished.   Cardiovascular: Normal rate and regular rhythm.   Pulmonary/Chest: Effort normal and breath sounds normal.   Abdominal: Soft. Bowel sounds are normal. There is no CVA tenderness.   Neurological: She is alert and oriented to person, place, and time.   Skin: Skin is warm and dry.   Psychiatric: She has a normal mood and affect. Her behavior is normal. Judgment and thought  content normal.   Nursing note and vitals reviewed.      Procedure  Procedures      Assessment and Plan  Diagnoses and all orders for this visit:  Hematuria, gross  -     URINALYSIS & REFLEX MICROSCOPY WITH CULTURE IF INDICATED    Will check UA/culture and treat if needed    Increase fluids  May need imaging if no infection    F/u in 1 week if no improvement or sooner if worsening sxs.    3/9/2021  Sharri Weir PA-C     Refer to the Assessment tab to view/cancel completed assessment.

## 2024-06-06 NOTE — DISCHARGE NOTE ANTEPARTUM - CARE PLAN
Principal Discharge DX:	39 weeks gestation of pregnancy  Assessment and plan of treatment:	Patient planned for IOL due to nrNST in office, NST in triage reactive with BPP 8/8. Fetal status reassuring and patient electing for attempting for spontaneous labor.   1

## 2024-06-06 NOTE — OB PROVIDER H&P - ASSESSMENT
A/P: EDMAR SHEETS is a 18y  at 39wk6d admitted to L&D for IOL due to nrNST in the office.  - Admit to L&D  - Consent  - Admission labs ordered  - IV fluids  - Fetus: Cat I tracing. Continuous toco and fetal monitoring.   - GBS: Negative  - Analgesia desired: epidural upon request  - Plan to initiate IOL with VCyto    Discussed with Dr. Arechiga A/P: EDMAR SHEETS is a 18y  at 39wk6d admitted to L&D for possible induction of labor due to nrNST in the office.  - Admit to L&D  - BPP , TYE 26.2 vertex anterior  - FHT reactive and reassuring  - Isolated contractions  - No concern for baby at this time      Discussed with Dr. Arechiga A/P: EDMAR SHEETS is a 18y  at 39wk6d  BPP , TYE 26.2 vertex anterior  - FHT reactive and reassuring  - Isolated contractions  - No concern for baby at this time

## 2024-06-13 ENCOUNTER — INPATIENT (INPATIENT)
Facility: HOSPITAL | Age: 19
LOS: 4 days | Discharge: HOME | DRG: 833 | End: 2024-06-18
Attending: OBSTETRICS & GYNECOLOGY | Admitting: OBSTETRICS & GYNECOLOGY
Payer: COMMERCIAL

## 2024-06-13 VITALS
HEIGHT: 60 IN | HEART RATE: 117 BPM | WEIGHT: 293 LBS | DIASTOLIC BLOOD PRESSURE: 72 MMHG | TEMPERATURE: 98 F | RESPIRATION RATE: 18 BRPM | SYSTOLIC BLOOD PRESSURE: 113 MMHG

## 2024-06-13 DIAGNOSIS — O48.0 POST-TERM PREGNANCY: ICD-10-CM

## 2024-06-13 LAB
BASOPHILS # BLD AUTO: 0.08 K/UL — SIGNIFICANT CHANGE UP (ref 0–0.2)
BASOPHILS NFR BLD AUTO: 0.7 % — SIGNIFICANT CHANGE UP (ref 0–2)
BLD GP AB SCN SERPL QL: SIGNIFICANT CHANGE UP
EOSINOPHIL # BLD AUTO: 0.64 K/UL — HIGH (ref 0–0.5)
EOSINOPHIL NFR BLD AUTO: 5.9 % — SIGNIFICANT CHANGE UP (ref 0–6)
HCT VFR BLD CALC: 35.2 % — SIGNIFICANT CHANGE UP (ref 34.5–45)
HGB BLD-MCNC: 11.6 G/DL — SIGNIFICANT CHANGE UP (ref 11.5–15.5)
IMM GRANULOCYTES NFR BLD AUTO: 1.4 % — HIGH (ref 0–0.9)
LYMPHOCYTES # BLD AUTO: 2.2 K/UL — SIGNIFICANT CHANGE UP (ref 1–3.3)
LYMPHOCYTES # BLD AUTO: 20.3 % — SIGNIFICANT CHANGE UP (ref 13–44)
MCHC RBC-ENTMCNC: 28.3 PG — SIGNIFICANT CHANGE UP (ref 27–34)
MCHC RBC-ENTMCNC: 33 GM/DL — SIGNIFICANT CHANGE UP (ref 32–36)
MCV RBC AUTO: 85.9 FL — SIGNIFICANT CHANGE UP (ref 80–100)
MONOCYTES # BLD AUTO: 0.91 K/UL — HIGH (ref 0–0.9)
MONOCYTES NFR BLD AUTO: 8.4 % — SIGNIFICANT CHANGE UP (ref 2–14)
NEUTROPHILS # BLD AUTO: 6.85 K/UL — SIGNIFICANT CHANGE UP (ref 1.8–7.4)
NEUTROPHILS NFR BLD AUTO: 63.3 % — SIGNIFICANT CHANGE UP (ref 43–77)
PLATELET # BLD AUTO: 341 K/UL — SIGNIFICANT CHANGE UP (ref 150–400)
RBC # BLD: 4.1 M/UL — SIGNIFICANT CHANGE UP (ref 3.8–5.2)
RBC # FLD: 15.9 % — HIGH (ref 10.3–14.5)
WBC # BLD: 10.83 K/UL — HIGH (ref 3.8–10.5)
WBC # FLD AUTO: 10.83 K/UL — HIGH (ref 3.8–10.5)

## 2024-06-13 RX ORDER — TRISODIUM CITRATE DIHYDRATE AND CITRIC ACID MONOHYDRATE 500; 334 MG/5ML; MG/5ML
30 SOLUTION ORAL ONCE
Refills: 0 | Status: COMPLETED | OUTPATIENT
Start: 2024-06-13 | End: 2024-06-16

## 2024-06-13 RX ORDER — DEXTROSE MONOHYDRATE AND SODIUM CHLORIDE 5; .3 G/100ML; G/100ML
1000 INJECTION, SOLUTION INTRAVENOUS
Refills: 0 | Status: DISCONTINUED | OUTPATIENT
Start: 2024-06-13 | End: 2024-06-16

## 2024-06-13 RX ORDER — OXYTOCIN 30 [USP'U]/500ML
333.33 INJECTION, SOLUTION INTRAVENOUS
Qty: 20 | Refills: 0 | Status: DISCONTINUED | OUTPATIENT
Start: 2024-06-13 | End: 2024-06-18

## 2024-06-14 LAB
HCV AB S/CO SERPL IA: 0.14 S/CO — SIGNIFICANT CHANGE UP (ref 0–0.99)
HCV AB SERPL-IMP: SIGNIFICANT CHANGE UP
T PALLIDUM AB TITR SER: NEGATIVE — SIGNIFICANT CHANGE UP

## 2024-06-15 ENCOUNTER — TRANSCRIPTION ENCOUNTER (OUTPATIENT)
Age: 19
End: 2024-06-15

## 2024-06-15 RX ORDER — DEXTROSE MONOHYDRATE AND SODIUM CHLORIDE 5; .3 G/100ML; G/100ML
1000 INJECTION, SOLUTION INTRAVENOUS
Refills: 0 | Status: DISCONTINUED | OUTPATIENT
Start: 2024-06-15 | End: 2024-06-18

## 2024-06-15 RX ORDER — OXYTOCIN 30 [USP'U]/500ML
INJECTION, SOLUTION INTRAVENOUS
Qty: 30 | Refills: 0 | Status: DISCONTINUED | OUTPATIENT
Start: 2024-06-15 | End: 2024-06-16

## 2024-06-15 RX ORDER — DEXTROSE MONOHYDRATE AND SODIUM CHLORIDE 5; .3 G/100ML; G/100ML
500 INJECTION, SOLUTION INTRAVENOUS ONCE
Refills: 0 | Status: DISCONTINUED | OUTPATIENT
Start: 2024-06-15 | End: 2024-06-18

## 2024-06-15 RX ADMIN — DEXTROSE MONOHYDRATE AND SODIUM CHLORIDE 125 MILLILITER(S): 5; .3 INJECTION, SOLUTION INTRAVENOUS at 14:35

## 2024-06-15 RX ADMIN — OXYTOCIN 2 MILLIUNIT(S)/MIN: 30 INJECTION, SOLUTION INTRAVENOUS at 06:35

## 2024-06-16 ENCOUNTER — TRANSCRIPTION ENCOUNTER (OUTPATIENT)
Age: 19
End: 2024-06-16

## 2024-06-16 LAB — BLD GP AB SCN SERPL QL: SIGNIFICANT CHANGE UP

## 2024-06-16 PROCEDURE — 88307 TISSUE EXAM BY PATHOLOGIST: CPT | Mod: 26

## 2024-06-16 RX ORDER — OXYCODONE HYDROCHLORIDE 100 MG/5ML
5 SOLUTION ORAL
Refills: 0 | Status: DISCONTINUED | OUTPATIENT
Start: 2024-06-16 | End: 2024-06-16

## 2024-06-16 RX ORDER — ACETAMINOPHEN 325 MG
1000 TABLET ORAL ONCE
Refills: 0 | Status: COMPLETED | OUTPATIENT
Start: 2024-06-16 | End: 2024-06-16

## 2024-06-16 RX ORDER — OXYTOCIN 30 [USP'U]/500ML
INJECTION, SOLUTION INTRAVENOUS
Qty: 30 | Refills: 0 | Status: DISCONTINUED | OUTPATIENT
Start: 2024-06-16 | End: 2024-06-16

## 2024-06-16 RX ORDER — OXYCODONE HYDROCHLORIDE 100 MG/5ML
5 SOLUTION ORAL ONCE
Refills: 0 | Status: DISCONTINUED | OUTPATIENT
Start: 2024-06-16 | End: 2024-06-18

## 2024-06-16 RX ORDER — LANOLIN
1 WAX (GRAM) MISCELLANEOUS EVERY 6 HOURS
Refills: 0 | Status: DISCONTINUED | OUTPATIENT
Start: 2024-06-16 | End: 2024-06-18

## 2024-06-16 RX ORDER — SCOPOLAMINE 1.5 MG/1
1 PATCH, EXTENDED RELEASE TRANSDERMAL ONCE
Refills: 0 | Status: COMPLETED | OUTPATIENT
Start: 2024-06-16 | End: 2024-06-16

## 2024-06-16 RX ORDER — DIPHENOXYLATE HCL/ATROPINE 2.5-.025MG
2 TABLET ORAL ONCE
Refills: 0 | Status: DISCONTINUED | OUTPATIENT
Start: 2024-06-16 | End: 2024-06-16

## 2024-06-16 RX ORDER — TRANEXAMIC ACID 100 MG/ML
1000 INJECTION, SOLUTION INTRAVENOUS ONCE
Refills: 0 | Status: COMPLETED | OUTPATIENT
Start: 2024-06-16 | End: 2024-06-16

## 2024-06-16 RX ORDER — DIPHENHYDRAMINE HCL 12.5MG/5ML
25 ELIXIR ORAL EVERY 4 HOURS
Refills: 0 | Status: DISCONTINUED | OUTPATIENT
Start: 2024-06-16 | End: 2024-06-18

## 2024-06-16 RX ORDER — DEXAMETHASONE 1 MG/1
4 TABLET ORAL EVERY 6 HOURS
Refills: 0 | Status: DISCONTINUED | OUTPATIENT
Start: 2024-06-16 | End: 2024-06-18

## 2024-06-16 RX ORDER — NALBUPHINE HCL 100 %
2.5 POWDER (GRAM) MISCELLANEOUS EVERY 6 HOURS
Refills: 0 | Status: DISCONTINUED | OUTPATIENT
Start: 2024-06-16 | End: 2024-06-18

## 2024-06-16 RX ORDER — SIMETHICONE 40MG/0.6ML
80 SUSPENSION, DROPS(FINAL DOSAGE FORM)(ML) ORAL EVERY 4 HOURS
Refills: 0 | Status: DISCONTINUED | OUTPATIENT
Start: 2024-06-16 | End: 2024-06-18

## 2024-06-16 RX ORDER — KETOROLAC TROMETHAMINE 30 MG/ML
30 INJECTION, SOLUTION INTRAMUSCULAR EVERY 6 HOURS
Refills: 0 | Status: DISCONTINUED | OUTPATIENT
Start: 2024-06-16 | End: 2024-06-17

## 2024-06-16 RX ORDER — DEXTROSE MONOHYDRATE AND SODIUM CHLORIDE 5; .3 G/100ML; G/100ML
1000 INJECTION, SOLUTION INTRAVENOUS ONCE
Refills: 0 | Status: COMPLETED | OUTPATIENT
Start: 2024-06-16 | End: 2024-06-16

## 2024-06-16 RX ORDER — OXYCODONE HYDROCHLORIDE 100 MG/5ML
5 SOLUTION ORAL
Refills: 0 | Status: DISCONTINUED | OUTPATIENT
Start: 2024-06-16 | End: 2024-06-18

## 2024-06-16 RX ORDER — CEFAZOLIN 10 G/1
2000 INJECTION, POWDER, FOR SOLUTION INTRAVENOUS ONCE
Refills: 0 | Status: COMPLETED | OUTPATIENT
Start: 2024-06-16 | End: 2024-06-16

## 2024-06-16 RX ORDER — ENOXAPARIN SODIUM 100 MG/ML
40 INJECTION SUBCUTANEOUS EVERY 24 HOURS
Refills: 0 | Status: DISCONTINUED | OUTPATIENT
Start: 2024-06-16 | End: 2024-06-18

## 2024-06-16 RX ORDER — CARBOPROST TROMETHAMINE 250 UG/ML
250 INJECTION, SOLUTION INTRAMUSCULAR ONCE
Refills: 0 | Status: COMPLETED | OUTPATIENT
Start: 2024-06-16 | End: 2024-06-16

## 2024-06-16 RX ORDER — OXYTOCIN 30 [USP'U]/500ML
333.33 INJECTION, SOLUTION INTRAVENOUS
Qty: 20 | Refills: 0 | Status: DISCONTINUED | OUTPATIENT
Start: 2024-06-16 | End: 2024-06-18

## 2024-06-16 RX ORDER — ONDANSETRON HYDROCHLORIDE 2 MG/ML
4 INJECTION INTRAMUSCULAR; INTRAVENOUS EVERY 6 HOURS
Refills: 0 | Status: DISCONTINUED | OUTPATIENT
Start: 2024-06-16 | End: 2024-06-18

## 2024-06-16 RX ORDER — ACETAMINOPHEN 325 MG
975 TABLET ORAL
Refills: 0 | Status: DISCONTINUED | OUTPATIENT
Start: 2024-06-16 | End: 2024-06-18

## 2024-06-16 RX ORDER — NALOXONE HYDROCHLORIDE 1 MG/ML
0.1 INJECTION PARENTERAL
Refills: 0 | Status: DISCONTINUED | OUTPATIENT
Start: 2024-06-16 | End: 2024-06-18

## 2024-06-16 RX ORDER — DEXTROSE MONOHYDRATE AND SODIUM CHLORIDE 5; .3 G/100ML; G/100ML
1000 INJECTION, SOLUTION INTRAVENOUS
Refills: 0 | Status: DISCONTINUED | OUTPATIENT
Start: 2024-06-16 | End: 2024-06-18

## 2024-06-16 RX ORDER — TETANUS TOXOID, REDUCED DIPHTHERIA TOXOID AND ACELLULAR PERTUSSIS VACCINE, ADSORBED 5; 2.5; 8; 8; 2.5 [IU]/.5ML; [IU]/.5ML; UG/.5ML; UG/.5ML; UG/.5ML
0.5 SUSPENSION INTRAMUSCULAR ONCE
Refills: 0 | Status: DISCONTINUED | OUTPATIENT
Start: 2024-06-16 | End: 2024-06-18

## 2024-06-16 RX ORDER — AZITHROMYCIN 250 MG/1
500 TABLET, FILM COATED ORAL ONCE
Refills: 0 | Status: COMPLETED | OUTPATIENT
Start: 2024-06-16 | End: 2024-06-16

## 2024-06-16 RX ORDER — DIPHENHYDRAMINE HCL 12.5MG/5ML
25 ELIXIR ORAL EVERY 6 HOURS
Refills: 0 | Status: DISCONTINUED | OUTPATIENT
Start: 2024-06-16 | End: 2024-06-18

## 2024-06-16 RX ORDER — FAMOTIDINE 40 MG
20 TABLET ORAL ONCE
Refills: 0 | Status: COMPLETED | OUTPATIENT
Start: 2024-06-16 | End: 2024-06-16

## 2024-06-16 RX ADMIN — Medication 400 MILLIGRAM(S): at 05:56

## 2024-06-16 RX ADMIN — Medication 20 MILLIGRAM(S): at 09:43

## 2024-06-16 RX ADMIN — Medication 1 APPLICATION(S): at 09:51

## 2024-06-16 RX ADMIN — KETOROLAC TROMETHAMINE 30 MILLIGRAM(S): 30 INJECTION, SOLUTION INTRAMUSCULAR at 18:13

## 2024-06-16 RX ADMIN — ENOXAPARIN SODIUM 40 MILLIGRAM(S): 100 INJECTION SUBCUTANEOUS at 23:01

## 2024-06-16 RX ADMIN — CARBOPROST TROMETHAMINE 250 MICROGRAM(S): 250 INJECTION, SOLUTION INTRAMUSCULAR at 12:17

## 2024-06-16 RX ADMIN — KETOROLAC TROMETHAMINE 30 MILLIGRAM(S): 30 INJECTION, SOLUTION INTRAMUSCULAR at 12:34

## 2024-06-16 RX ADMIN — SCOPOLAMINE 1 PATCH: 1.5 PATCH, EXTENDED RELEASE TRANSDERMAL at 09:43

## 2024-06-16 RX ADMIN — OXYTOCIN 2 MILLIUNIT(S)/MIN: 30 INJECTION, SOLUTION INTRAVENOUS at 08:03

## 2024-06-16 RX ADMIN — TRANEXAMIC ACID 220 MILLIGRAM(S): 100 INJECTION, SOLUTION INTRAVENOUS at 11:35

## 2024-06-16 RX ADMIN — TRISODIUM CITRATE DIHYDRATE AND CITRIC ACID MONOHYDRATE 30 MILLILITER(S): 500; 334 SOLUTION ORAL at 09:43

## 2024-06-16 RX ADMIN — OXYTOCIN 1000 MILLIUNIT(S)/MIN: 30 INJECTION, SOLUTION INTRAVENOUS at 12:13

## 2024-06-16 RX ADMIN — CEFAZOLIN 2000 MILLIGRAM(S): 10 INJECTION, POWDER, FOR SOLUTION INTRAVENOUS at 11:40

## 2024-06-16 RX ADMIN — DEXTROSE MONOHYDRATE AND SODIUM CHLORIDE 1000 MILLILITER(S): 5; .3 INJECTION, SOLUTION INTRAVENOUS at 07:33

## 2024-06-16 RX ADMIN — AZITHROMYCIN 255 MILLIGRAM(S): 250 TABLET, FILM COATED ORAL at 11:23

## 2024-06-16 RX ADMIN — KETOROLAC TROMETHAMINE 30 MILLIGRAM(S): 30 INJECTION, SOLUTION INTRAMUSCULAR at 23:01

## 2024-06-16 RX ADMIN — Medication 2 TABLET(S): at 13:18

## 2024-06-17 LAB
BASOPHILS # BLD AUTO: 0.06 K/UL — SIGNIFICANT CHANGE UP (ref 0–0.2)
BASOPHILS NFR BLD AUTO: 0.5 % — SIGNIFICANT CHANGE UP (ref 0–2)
EOSINOPHIL # BLD AUTO: 0.51 K/UL — HIGH (ref 0–0.5)
EOSINOPHIL NFR BLD AUTO: 3.9 % — SIGNIFICANT CHANGE UP (ref 0–6)
HCT VFR BLD CALC: 26.8 % — LOW (ref 34.5–45)
HGB BLD-MCNC: 8.9 G/DL — LOW (ref 11.5–15.5)
IMM GRANULOCYTES NFR BLD AUTO: 1.1 % — HIGH (ref 0–0.9)
LYMPHOCYTES # BLD AUTO: 18.8 % — SIGNIFICANT CHANGE UP (ref 13–44)
LYMPHOCYTES # BLD AUTO: 2.47 K/UL — SIGNIFICANT CHANGE UP (ref 1–3.3)
MCHC RBC-ENTMCNC: 28.9 PG — SIGNIFICANT CHANGE UP (ref 27–34)
MCHC RBC-ENTMCNC: 33.2 GM/DL — SIGNIFICANT CHANGE UP (ref 32–36)
MCV RBC AUTO: 87 FL — SIGNIFICANT CHANGE UP (ref 80–100)
MONOCYTES # BLD AUTO: 1.05 K/UL — HIGH (ref 0–0.9)
MONOCYTES NFR BLD AUTO: 8 % — SIGNIFICANT CHANGE UP (ref 2–14)
NEUTROPHILS # BLD AUTO: 8.89 K/UL — HIGH (ref 1.8–7.4)
NEUTROPHILS NFR BLD AUTO: 67.7 % — SIGNIFICANT CHANGE UP (ref 43–77)
PLATELET # BLD AUTO: 267 K/UL — SIGNIFICANT CHANGE UP (ref 150–400)
RBC # BLD: 3.08 M/UL — LOW (ref 3.8–5.2)
RBC # FLD: 16.4 % — HIGH (ref 10.3–14.5)
WBC # BLD: 13.12 K/UL — HIGH (ref 3.8–10.5)
WBC # FLD AUTO: 13.12 K/UL — HIGH (ref 3.8–10.5)

## 2024-06-17 RX ADMIN — KETOROLAC TROMETHAMINE 30 MILLIGRAM(S): 30 INJECTION, SOLUTION INTRAMUSCULAR at 06:11

## 2024-06-17 RX ADMIN — Medication 600 MILLIGRAM(S): at 23:14

## 2024-06-17 RX ADMIN — Medication 80 MILLIGRAM(S): at 08:55

## 2024-06-17 RX ADMIN — Medication 600 MILLIGRAM(S): at 23:17

## 2024-06-17 RX ADMIN — Medication 975 MILLIGRAM(S): at 14:57

## 2024-06-17 RX ADMIN — Medication 975 MILLIGRAM(S): at 20:56

## 2024-06-17 RX ADMIN — Medication 975 MILLIGRAM(S): at 08:54

## 2024-06-17 RX ADMIN — Medication 600 MILLIGRAM(S): at 17:11

## 2024-06-17 RX ADMIN — Medication 975 MILLIGRAM(S): at 02:44

## 2024-06-17 RX ADMIN — Medication 975 MILLIGRAM(S): at 20:55

## 2024-06-17 RX ADMIN — ENOXAPARIN SODIUM 40 MILLIGRAM(S): 100 INJECTION SUBCUTANEOUS at 23:14

## 2024-06-17 RX ADMIN — Medication 600 MILLIGRAM(S): at 11:54

## 2024-06-18 VITALS
RESPIRATION RATE: 18 BRPM | DIASTOLIC BLOOD PRESSURE: 73 MMHG | TEMPERATURE: 98 F | HEART RATE: 92 BPM | SYSTOLIC BLOOD PRESSURE: 118 MMHG | OXYGEN SATURATION: 99 %

## 2024-06-18 PROCEDURE — 88307 TISSUE EXAM BY PATHOLOGIST: CPT

## 2024-06-18 PROCEDURE — 59050 FETAL MONITOR W/REPORT: CPT

## 2024-06-18 PROCEDURE — 86780 TREPONEMA PALLIDUM: CPT

## 2024-06-18 PROCEDURE — 36415 COLL VENOUS BLD VENIPUNCTURE: CPT

## 2024-06-18 PROCEDURE — 86900 BLOOD TYPING SEROLOGIC ABO: CPT

## 2024-06-18 PROCEDURE — 85025 COMPLETE CBC W/AUTO DIFF WBC: CPT

## 2024-06-18 PROCEDURE — 86901 BLOOD TYPING SEROLOGIC RH(D): CPT

## 2024-06-18 PROCEDURE — 86803 HEPATITIS C AB TEST: CPT

## 2024-06-18 PROCEDURE — 86850 RBC ANTIBODY SCREEN: CPT

## 2024-06-18 RX ORDER — ACETAMINOPHEN 325 MG
3 TABLET ORAL
Qty: 84 | Refills: 0
Start: 2024-06-18 | End: 2024-06-24

## 2024-06-18 RX ADMIN — Medication 975 MILLIGRAM(S): at 09:17

## 2024-06-18 RX ADMIN — Medication 600 MILLIGRAM(S): at 05:32

## 2024-06-18 RX ADMIN — Medication 600 MILLIGRAM(S): at 05:18

## 2024-06-18 RX ADMIN — Medication 975 MILLIGRAM(S): at 15:16

## 2024-06-18 RX ADMIN — Medication 600 MILLIGRAM(S): at 11:54

## 2024-07-05 LAB — SURGICAL PATHOLOGY STUDY: SIGNIFICANT CHANGE UP
